# Patient Record
Sex: MALE | Race: WHITE | URBAN - METROPOLITAN AREA
[De-identification: names, ages, dates, MRNs, and addresses within clinical notes are randomized per-mention and may not be internally consistent; named-entity substitution may affect disease eponyms.]

---

## 2017-04-17 ENCOUNTER — TELEPHONE (OUTPATIENT)
Dept: OTHER | Facility: OUTPATIENT CENTER | Age: 67
End: 2017-04-17
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
## 2017-04-19 NOTE — TELEPHONE ENCOUNTER
PER HOSSEIN @ [employer] REF #[id number] - KENNETH OON - NO CO-PAY, $166 DEDUCT (MET) 20% CO-INS W/ AN OOPM $10,000 ($1034.73MET) NO AUTH/EXCLUS. PATIENT RESPON FOR 20% OF $625.60 FOR DA. LVM TO [facility] @ [facility].

## 2017-05-03 ENCOUNTER — OFFICE VISIT (OUTPATIENT)
Dept: OTHER | Facility: OUTPATIENT CENTER | Age: 67
End: 2017-05-03

## 2017-05-03 DIAGNOSIS — F43.23 ADJUSTMENT DISORDER WITH MIXED ANXIETY AND DEPRESSED MOOD: Primary | ICD-10-CM

## 2017-05-03 NOTE — MR AVS SNAPSHOT
After Visit Summary   5/3/2017    William Paige    MRN: [id number]           Patient Information     Date Of Birth          1950        Visit Information        Provider Department      5/3/2017 1:00 PM Wellington Monge, PhD DEV [facility]        Today's Diagnoses     Adjustment disorder with mixed anxiety and depressed mood    -  1       Follow-ups after your visit        Your next 10 appointments already scheduled     May 17, 2017  9:00 AM CDT   INDIVIDUAL THERAPY with Wellington Monge PhD LP   [facility] ([facility])    [address]  Mail [address] 7521  [facility] [address]   [phone]            May 31, 2017  9:00 AM CDT   INDIVIDUAL THERAPY with Wellington Monge PhD LP   [facility] ([facility])    [address]  [address]  [facility] [address]   [phone]              Who to contact     Please call your clinic at [phone] to:    Ask questions about your health    Make or cancel appointments    Discuss your medicines    Learn about your test results    Speak to your doctor   If you have compliments or concerns about an experience at your clinic, or if you wish to file a complaint, please contact [facility] Patient Relations at [phone] or email us at [email or web address]         Additional Information About Your Visit        MyChart Information     Quettra is an electronic gateway that provides easy, online access to your medical records. With Quettra, you can request a clinic appointment, read your test results, renew a prescription or communicate with your care team.     To sign up for [employer] visit the website at [email or web address]   You will be asked to enter the access code listed below, as well as some personal information. Please follow the directions to create your username and password.     Your access code is: [email or web address]  Expires: 8/11/2017  12:12 PM     Your access code will  in 90 days. If you need help or a new code, please contact your [facility] or call [phone] for assistance.        Care EveryWhere ID     This is your Care EveryWhere ID. This could be used by other organizations to access your [facility] medical records  [id number]         Blood Pressure from Last 3 Encounters:   No data found for BP    Weight from Last 3 Encounters:   No data found for Wt              We Performed the Following     Diagnostic Assessment (complete) [01528]     Mental Health Tx Plan Scan (HIM Scan)        Primary Care Provider    None Specified       No primary provider on file.        Thank you!     Thank you for choosing [facility]  for your care. Our goal is always to provide you with excellent care. Hearing back from our patients is one way we can continue to improve our services. Please take a few minutes to complete the written survey that you may receive in the mail after your visit with us. Thank you!             Your Updated Medication List - Protect others around you: Learn how to safely use, store and throw away your medicines at www.disposemymeds.org.      Notice  As of 5/3/2017 11:59 PM    You have not been prescribed any medications.

## 2017-05-13 ASSESSMENT — ANXIETY QUESTIONNAIRES
5. BEING SO RESTLESS THAT IT IS HARD TO SIT STILL: NOT AT ALL
2. NOT BEING ABLE TO STOP OR CONTROL WORRYING: SEVERAL DAYS
7. FEELING AFRAID AS IF SOMETHING AWFUL MIGHT HAPPEN: NOT AT ALL
4. TROUBLE RELAXING: SEVERAL DAYS
1. FEELING NERVOUS, ANXIOUS, OR ON EDGE: SEVERAL DAYS
3. WORRYING TOO MUCH ABOUT DIFFERENT THINGS: SEVERAL DAYS
GAD7 TOTAL SCORE: 4
6. BECOMING EASILY ANNOYED OR IRRITABLE: NOT AT ALL
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
## 2017-05-14 ASSESSMENT — ANXIETY QUESTIONNAIRES: GAD7 TOTAL SCORE: 4

## 2017-05-14 ASSESSMENT — PATIENT HEALTH QUESTIONNAIRE - PHQ9: SUM OF ALL RESPONSES TO PHQ QUESTIONS 1-9: 8

## 2017-05-17 ENCOUNTER — OFFICE VISIT (OUTPATIENT)
Dept: OTHER | Facility: OUTPATIENT CENTER | Age: 67
End: 2017-05-17

## 2017-05-17 DIAGNOSIS — F43.23 ADJUSTMENT DISORDER WITH MIXED ANXIETY AND DEPRESSED MOOD: Primary | ICD-10-CM
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
## 2017-05-17 NOTE — MR AVS SNAPSHOT
After Visit Summary   2017    William Paige    MRN: [id number]           Patient Information     Date Of Birth          1950        Visit Information        Provider Department      2017 9:00 AM Wellington Monge, PhD DEV [facility]        Today's Diagnoses     Adjustment disorder with mixed anxiety and depressed mood    -  1       Follow-ups after your visit        Your next 10 appointments already scheduled     May 31, 2017  9:00 AM CDT   INDIVIDUAL THERAPY with Wellington Monge, PhD DEV   [facility] ([facility])    [address]  [address] 7521  [facility] [address]   [phone]              Who to contact     Please call your clinic at [phone] to:    Ask questions about your health    Make or cancel appointments    Discuss your medicines    Learn about your test results    Speak to your doctor   If you have compliments or concerns about an experience at your clinic, or if you wish to file a complaint, please contact [facility] Patient Relations at [phone] or email us at [email or web address]         Additional Information About Your Visit        MyChart Information     Playnomics is an electronic gateway that provides easy, online access to your medical records. With Playnomics, you can request a clinic appointment, read your test results, renew a prescription or communicate with your care team.     To sign up for [employer] visit the website at [email or web address]   You will be asked to enter the access code listed below, as well as some personal information. Please follow the directions to create your username and password.     Your access code is: [email or web address]  Expires: 2017 12:12 PM     Your access code will  in 90 days. If you need help or a new code, please contact your [facility] or call [phone] for assistance.        Care EveryWhere ID      This is your Care EveryWhere ID. This could be used by other organizations to access your [facility] medical records  [id number]         Blood Pressure from Last 3 Encounters:   No data found for BP    Weight from Last 3 Encounters:   No data found for Wt              We Performed the Following     Individual Psychotherapy (53+ min) [36939]        Primary Care Provider    None Specified       No primary provider on file.        Thank you!     Thank you for choosing [facility]  for your care. Our goal is always to provide you with excellent care. Hearing back from our patients is one way we can continue to improve our services. Please take a few minutes to complete the written survey that you may receive in the mail after your visit with us. Thank you!             Your Updated Medication List - Protect others around you: Learn how to safely use, store and throw away your medicines at www.disposemymeds.org.      Notice  As of 5/17/2017  9:58 AM    You have not been prescribed any medications.

## 2017-05-31 ENCOUNTER — OFFICE VISIT (OUTPATIENT)
Dept: OTHER | Facility: OUTPATIENT CENTER | Age: 67
End: 2017-05-31

## 2017-05-31 DIAGNOSIS — F43.23 ADJUSTMENT DISORDER WITH MIXED ANXIETY AND DEPRESSED MOOD: Primary | ICD-10-CM
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
## 2017-05-31 NOTE — MR AVS SNAPSHOT
After Visit Summary   2017    William Paige    MRN: [id number]           Patient Information     Date Of Birth          1950        Visit Information        Provider Department      2017 9:00 AM Wellington Monge, PhD  [facility]        Today's Diagnoses     Adjustment disorder with mixed anxiety and depressed mood    -  1       Follow-ups after your visit        Who to contact     Please call your clinic at [phone] to:    Ask questions about your health    Make or cancel appointments    Discuss your medicines    Learn about your test results    Speak to your doctor   If you have compliments or concerns about an experience at your clinic, or if you wish to file a complaint, please contact [facility] Patient Relations at [phone] or email us at [email or web address]         Additional Information About Your Visit        MyChart Information     Hotchalk is an electronic gateway that provides easy, online access to your medical records. With Hotchalk, you can request a clinic appointment, read your test results, renew a prescription or communicate with your care team.     To sign up for RUSBASEt visit the website at [email or web address]   You will be asked to enter the access code listed below, as well as some personal information. Please follow the directions to create your username and password.     Your access code is: [email or web address]  Expires: 2017 12:12 PM     Your access code will  in 90 days. If you need help or a new code, please contact your [facility] or call [phone] for assistance.        Care EveryWhere ID     This is your Care EveryWhere ID. This could be used by other organizations to access your [place] medical records  [id number]         Blood Pressure from Last 3 Encounters:   No data found for BP    Weight from Last 3 Encounters:   No data found for Wt               We Performed the Following     Individual Psychotherapy (53+ min) [21962]        Primary Care Provider    None Specified       No primary provider on file.        Thank you!     Thank you for choosing [facility]  for your care. Our goal is always to provide you with excellent care. Hearing back from our patients is one way we can continue to improve our services. Please take a few minutes to complete the written survey that you may receive in the mail after your visit with us. Thank you!             Your Updated Medication List - Protect others around you: Learn how to safely use, store and throw away your medicines at www.disposemymeds.org.      Notice  As of 5/31/2017  9:54 AM    You have not been prescribed any medications.

## 2017-06-19 ENCOUNTER — OFFICE VISIT (OUTPATIENT)
Dept: OTHER | Facility: OUTPATIENT CENTER | Age: 67
End: 2017-06-19

## 2017-06-19 DIAGNOSIS — F43.23 ADJUSTMENT DISORDER WITH MIXED ANXIETY AND DEPRESSED MOOD: Primary | ICD-10-CM

## 2017-06-19 NOTE — PROGRESS NOTES
[facility] -  Case Progress Note    Date of Service: 6/19/17  Client Name: William Paige  YOB: 1950  MRN:  [id number]  Treating Provider: Wellington Monge, PhD LP  Type of Session: Individual  Present in Session: client only (medical student Meghan was present)  Number of Minutes:  55    Current Symptoms/Status:  Client has been experiencing notable anxiety and depression in response to recent stressors, which has interfered with personal well-being and interpersonal functioning.      Progress Toward Treatment Goals:   He has been actively processing his emotions. He has been involved in a [employer] meetup group.    Intervention: Modality and Description:  CBT and psychodynamic techniques were used to help explore the client's emotions and sexuality. Sal is still bothered by noticing men sexually. He wants to be in a relationship, but is concerned that he is overly focused on sexual aspects. He has regrets about what he has not tried sexually in the past, such as S&M or group sex. Sal is concerned that if he experimented with S&M that he would become addicted to sex. Therapist wondered if he could experiment with S&M in a healthy way. Sal did not think it it is wrong to notice men sexually when he is alone. He thinks it is normal to notice men sexually. Sal talked about his interaction with some friends in a car; they were irritated with his sex-related comments. The idea that he has trouble shifting his focus was discussed. He talked about avoiding conflict with others, which may relate to his family of origin. Sal seemed unsure about what issues applied to him and which ones did not. A discussion was had about his social skills and dating. He realized that he did not communicate openly or clearly with Edward. Ways of communicating more openly were discussed. Sal is interested in dating and wonders how to do it.       Response to Intervention:  Open. Verbal. Took  feedback.      Assignment:  Maintain self-care.      Diagnosis:  309.28 - Adjustment Disorder with Mixed Anxiety and Depressed Mood      Plan / Need for Future Services:  Return for therapy in 2 weeks.      Wellington Monge, PhD LP

## 2017-06-19 NOTE — MR AVS SNAPSHOT
After Visit Summary   6/19/2017    William Paige    MRN: [id number]           Patient Information     Date Of Birth          1950        Visit Information        Provider Department      6/19/2017 8:00 AM Wellington Monge, PhD DEV [facility]        Today's Diagnoses     Adjustment disorder with mixed anxiety and depressed mood    -  1       Follow-ups after your visit        Your next 10 appointments already scheduled     Jul 12, 2017  8:00 AM CDT   INDIVIDUAL THERAPY with Wellington Monge PhD LP   [facility] ([facility])    [address]  Mail [address] 7521  [facility] [address]   [phone]            Jul 26, 2017  8:00 AM CDT   INDIVIDUAL THERAPY with Wellington Monge PhD LP   [facility] ([facility])    [address]  [address]  [facility] [address]   [phone]              Who to contact     Please call your clinic at [phone] to:    Ask questions about your health    Make or cancel appointments    Discuss your medicines    Learn about your test results    Speak to your doctor   If you have compliments or concerns about an experience at your clinic, or if you wish to file a complaint, please contact [facility] Patient Relations at [phone] or email us at [email or web address]         Additional Information About Your Visit        MyChart Information     Tripeese is an electronic gateway that provides easy, online access to your medical records. With Tripeese, you can request a clinic appointment, read your test results, renew a prescription or communicate with your care team.     To sign up for Transaqt visit the website at [email or web address]   You will be asked to enter the access code listed below, as well as some personal information. Please follow the directions to create your username and password.     Your access code is: [email or web address]  Expires:  2017 12:12 PM     Your access code will  in 90 days. If you need help or a new code, please contact your [facility] or call [phone] for assistance.        Care EveryWhere ID     This is your Care EveryWhere ID. This could be used by other organizations to access your [place] medical records  [id number]         Blood Pressure from Last 3 Encounters:   No data found for BP    Weight from Last 3 Encounters:   No data found for Wt              We Performed the Following     Individual Psychotherapy (53+ min) [80311]        Primary Care Provider    None Specified       No primary provider on file.        Thank you!     Thank you for choosing [facility]  for your care. Our goal is always to provide you with excellent care. Hearing back from our patients is one way we can continue to improve our services. Please take a few minutes to complete the written survey that you may receive in the mail after your visit with us. Thank you!             Your Updated Medication List - Protect others around you: Learn how to safely use, store and throw away your medicines at www.disposemymeds.org.      Notice  As of 2017  8:57 AM    You have not been prescribed any medications.

## 2017-07-12 ENCOUNTER — OFFICE VISIT (OUTPATIENT)
Dept: OTHER | Facility: OUTPATIENT CENTER | Age: 67
End: 2017-07-12

## 2017-07-12 DIAGNOSIS — F43.23 ADJUSTMENT DISORDER WITH MIXED ANXIETY AND DEPRESSED MOOD: Primary | ICD-10-CM

## 2017-07-12 NOTE — MR AVS SNAPSHOT
After Visit Summary   2017    William Paige    MRN: [id number]           Patient Information     Date Of Birth          1950        Visit Information        Provider Department      2017 8:00 AM Wellington Monge, PhD DEV [facility]        Today's Diagnoses     Adjustment disorder with mixed anxiety and depressed mood    -  1       Follow-ups after your visit        Your next 10 appointments already scheduled     2017  8:00 AM CDT   INDIVIDUAL THERAPY with Wellington Monge, PhD DEV   [facility] ([facility])    [address]  Mail [address] 7521  [facility] [address]   [phone]              Who to contact     Please call your clinic at [phone] to:    Ask questions about your health    Make or cancel appointments    Discuss your medicines    Learn about your test results    Speak to your doctor   If you have compliments or concerns about an experience at your clinic, or if you wish to file a complaint, please contact [facility] Patient Relations at [phone] or email us at [email or web address]         Additional Information About Your Visit        MyChart Information     Smarter Agent Mobile is an electronic gateway that provides easy, online access to your medical records. With Smarter Agent Mobile, you can request a clinic appointment, read your test results, renew a prescription or communicate with your care team.     To sign up for Smarter Agent Mobile visit the website at [email or web address]   You will be asked to enter the access code listed below, as well as some personal information. Please follow the directions to create your username and password.     Your access code is: [email or web address]  Expires: 2017 12:12 PM     Your access code will  in 90 days. If you need help or a new code, please contact your [facility] or call [phone] for assistance.        Care EveryWhere ID      This is your Care EveryWhere ID. This could be used by other organizations to access your [place] medical records  [id number]         Blood Pressure from Last 3 Encounters:   No data found for BP    Weight from Last 3 Encounters:   No data found for Wt              We Performed the Following     Individual Psychotherapy (53+ min) [31461]     Psychotherapy Interactive Complexity [81385]        Primary Care Provider    None Specified       No primary provider on file.        Equal Access to Services     [facility]: Hadii aad ku hadasho Soomaali, waaxda luqadaha, qaybta kaalmada adeegyada, waxay idiin hayaan adeeg jessicaleonarda laPhujorjen . So [facility] [phone].    ATENCIÓN: Si habla español, tiene a douglass disposición servicios gratuitos de asistencia lingüística. Eliame al [phone].    We comply with applicable federal civil rights laws and [place] laws. We do not discriminate on the basis of race, color, national origin, age, disability sex, sexual orientation or gender identity.            Thank you!     Thank you for choosing [facility]  for your care. Our goal is always to provide you with excellent care. Hearing back from our patients is one way we can continue to improve our services. Please take a few minutes to complete the written survey that you may receive in the mail after your visit with us. Thank you!             Your Updated Medication List - Protect others around you: Learn how to safely use, store and throw away your medicines at www.disposemymeds.org.      Notice  As of 7/12/2017  8:57 AM    You have not been prescribed any medications.
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
## 2017-07-26 ENCOUNTER — OFFICE VISIT (OUTPATIENT)
Dept: OTHER | Facility: OUTPATIENT CENTER | Age: 67
End: 2017-07-26

## 2017-07-26 DIAGNOSIS — F43.23 ADJUSTMENT DISORDER WITH MIXED ANXIETY AND DEPRESSED MOOD: Primary | ICD-10-CM

## 2017-07-26 NOTE — PROGRESS NOTES
[facility] -  Case Progress Note    Date of Service: 7/26/17  Client Name: William Paige  YOB: 1950  MRN:  [id number]  Treating Provider: Wellington Monge, PhD LP  Type of Session: Individual  Present in Session: client only  Number of Minutes:  53    Current Symptoms/Status:  Client has been experiencing notable anxiety and depression in response to recent stressors, which has interfered with personal well-being and interpersonal functioning.      Progress Toward Treatment Goals:   He has been actively processing his emotions. Sal has a new sponsor.    Intervention: Modality and Description:  CBT and psychodynamic techniques were used to help explore the client's emotions and sexuality. 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 He is struggling to figure out how to manage his time and do the paperwork. Dealing with clients has been challenging. He has had to be assertive. Sal has been frustrated in meeting new men. He is interested in Richard, a man he met at [employer]. Sudhir and Rell gave Sal Ramos's telephone number. Sal finds it most difficult to ask someone to coffee because he is concerned that the person will say no. He wants to be held and wants to be sexual. Sal talked about Michael (who is villatoro) and Rell (who is heterosexual). Michael has lent Sal money ($2,000) and wants to date him, but Sal is not interested. Sal is interested in Rell. Sal thinks he has been too dependent on Michael. Sal revealed his sexual thoughts to Rell and Michael, including the idea of a threesome, in order to set boundaries. Therapist advised Sal to set boundaries without revealing his sexual thoughts and fantasies. Time was spent talking about how he may need to tolerate his sexual thoughts or fantasies more.      Response to Intervention:  Open. Verbal. Took feedback. Tangential. He would jump topics at times.    Interactive Complexity    There are four specific  communication difficulties that complicate the work of the primary psychiatric procedure.  Interactive complexity (+16036) may be reported when at least one of these difficulties is present.    Communication difficulties present during current the psychiatric procedure include:  1. The need to manage maladaptive communication among participants that complicates delivery of care. Sal was fairly tangential today, making it difficult to follow him and requiring redirection.          Assignment:  Maintain self-care. Write memories of his mother's homonegativity. Start keeping a dream log.      Diagnosis:  309.28 - Adjustment Disorder with Mixed Anxiety and Depressed Mood      Plan / Need for Future Services:  Return for therapy in 2 weeks.      Wellington Monge, PhD LP

## 2017-07-26 NOTE — MR AVS SNAPSHOT
After Visit Summary   2017    William Paige    MRN: [id number]           Patient Information     Date Of Birth          1950        Visit Information        Provider Department      2017 8:00 AM Wellington Monge, PhD  [facility]        Today's Diagnoses     Adjustment disorder with mixed anxiety and depressed mood    -  1       Follow-ups after your visit        Who to contact     Please call your clinic at [phone] to:    Ask questions about your health    Make or cancel appointments    Discuss your medicines    Learn about your test results    Speak to your doctor   If you have compliments or concerns about an experience at your clinic, or if you wish to file a complaint, please contact [facility] Patient Relations at [phone] or email us at [email or web address]         Additional Information About Your Visit        MyChart Information     Linkurious is an electronic gateway that provides easy, online access to your medical records. With Linkurious, you can request a clinic appointment, read your test results, renew a prescription or communicate with your care team.     To sign up for ChaChat visit the website at [email or web address]   You will be asked to enter the access code listed below, as well as some personal information. Please follow the directions to create your username and password.     Your access code is: [email or web address]  Expires: 2017 12:12 PM     Your access code will  in 90 days. If you need help or a new code, please contact your [facility] or call [phone] for assistance.        Care EveryWhere ID     This is your Care EveryWhere ID. This could be used by other organizations to access your [place] medical records  [id number]         Blood Pressure from Last 3 Encounters:   No data found for BP    Weight from Last 3 Encounters:   No data found for Wt               We Performed the Following     Individual Psychotherapy (53+ min) [64536]     Psychotherapy Interactive Complexity [94870]        Primary Care Provider    None Specified       No primary provider on file.        Equal Access to Services     LURDES PETERSON : Kiran Jacobo, malinda duarte, almaerika postcatrinacarrie elmorecassicarrie, waxmima nnamdiin hayaadeandra constantinomatthew sanford bernardo arrington. So [facility] [place] [phone].    ATENCIÓN: Si habla español, tiene a douglass disposición servicios gratuitos de asistencia lingüística. Llame al [phone].    We comply with applicable federal civil rights laws and [place] laws. We do not discriminate on the basis of race, color, national origin, age, disability sex, sexual orientation or gender identity.            Thank you!     Thank you for choosing [facility]  for your care. Our goal is always to provide you with excellent care. Hearing back from our patients is one way we can continue to improve our services. Please take a few minutes to complete the written survey that you may receive in the mail after your visit with us. Thank you!             Your Updated Medication List - Protect others around you: Learn how to safely use, store and throw away your medicines at www.disposemymeds.org.      Notice  As of 7/26/2017  8:58 AM    You have not been prescribed any medications.

## 2017-08-09 ENCOUNTER — OFFICE VISIT (OUTPATIENT)
Dept: OTHER | Facility: OUTPATIENT CENTER | Age: 67
End: 2017-08-09

## 2017-08-09 DIAGNOSIS — F43.23 ADJUSTMENT DISORDER WITH MIXED ANXIETY AND DEPRESSED MOOD: Primary | ICD-10-CM

## 2017-08-09 NOTE — PROGRESS NOTES
[facility] -  Case Progress Note    Date of Service: 17  Client Name: William Paige  YOB: 1950  MRN:  [id number]  Treating Provider: Wellington Monge, PhD LP  Type of Session: Individual  Present in Session: client only  Number of Minutes:  40 (he arrived late)    Current Symptoms/Status:  Client has been experiencing notable anxiety and depression in response to recent stressors, which has interfered with personal well-being and interpersonal functioning.      Progress Toward Treatment Goals:   He has been actively processing his emotions. Sal has a new sponsor.    Intervention: Modality and Description:  CBT and psychodynamic techniques were used to help explore the client's emotions and sexuality. Sal has been sick for a week. He talked about the challenges of his job. He has to deal with conflict and chaos. A hug from a co-worker reminded him that he wants a partner. Sal admitted he has done little to date. He worried that he will have sex with a friend. He mentioned that he finds his sponsor sexually attractive. Therapist asked some questions and noted that Sal does not seem to have friends he finds sexually attractive--so why is he concerned about having sex with someone? Sal considered that he tries to create a problem. His mother was a worrier and she was against homosexuality. He came to her when he was 20 and she  when he was in his 50s. In his 40s, she mentioned that he is her son, but that she could not accept his lifestyle. Sal once called her for support and asked her to visit, and she said she would think about it. He was hurt. Therapist questioned if her homonegativity really affected him because Sal had two long-term relationships with men. He agreed. Sal had a hard time identifying how he would date. He eventually stated that he used match.com, [employer], and others (e.g., [employer]) to meet men for dating. He met some men. Sal eventually  stated that he stopped using the websites because his sponsor advised him that it was unhealthy. Therapist suggested that Sal needs to be an active critical thinker about what is healthy and unhealthy, rather than just parroting or following what others say. He understood.      Response to Intervention:  Open. Verbal. Took feedback. Tangential. He would jump topics at times.    Interactive Complexity    There are four specific communication difficulties that complicate the work of the primary psychiatric procedure.  Interactive complexity (+09062) may be reported when at least one of these difficulties is present.    Communication difficulties present during current the psychiatric procedure include:  1. The need to manage maladaptive communication among participants that complicates delivery of care. Sal was fairly tangential today, making it difficult to follow him and requiring redirection.    Assignment:  Maintain self-care. Join dating websites. Prepare to talk about trauma. Past tasks: Write memories of his mother's homonegativity. Start keeping a dream log.      Diagnosis:  309.28 - Adjustment Disorder with Mixed Anxiety and Depressed Mood      Plan / Need for Future Services:  Return for therapy in 2 weeks.      Wellington Monge, PhD LP

## 2017-08-09 NOTE — MR AVS SNAPSHOT
After Visit Summary   2017    William Paige    MRN: [id number]           Patient Information     Date Of Birth          1950        Visit Information        Provider Department      2017 8:00 AM Wellington Monge, PhD DEV [facility]        Today's Diagnoses     Adjustment disorder with mixed anxiety and depressed mood    -  1       Follow-ups after your visit        Your next 10 appointments already scheduled     Aug 23, 2017  9:00 AM CDT   INDIVIDUAL THERAPY with Wellington Monge, PhD DEV   [facility] ([facility])    [address]  Mail Code 7521  [facility] [address]   [phone]              Who to contact     Please call your clinic at [phone] to:    Ask questions about your health    Make or cancel appointments    Discuss your medicines    Learn about your test results    Speak to your doctor   If you have compliments or concerns about an experience at your clinic, or if you wish to file a complaint, please contact [facility] Patient Relations at [phone] or email us at [email or web address]         Additional Information About Your Visit        MyChart Information     [email or web address] is an electronic gateway that provides easy, online access to your medical records. With [email or web address], you can request a clinic appointment, read your test results, renew a prescription or communicate with your care team.     To sign up for [email or web address] visit the website at [email or web address]   You will be asked to enter the access code listed below, as well as some personal information. Please follow the directions to create your username and password.     Your access code is: [email or web address]  Expires: 2017 12:12 PM     Your access code will  in 90 days. If you need help or a new code, please contact your [facility] or call [phone] for assistance.        Care EveryWhere ID      This is your Care EveryWhere ID. This could be used by other organizations to access your [place] medical records  [id number]         Blood Pressure from Last 3 Encounters:   No data found for BP    Weight from Last 3 Encounters:   No data found for Wt              We Performed the Following     Individual Psychotherapy (38-52 min) [28177]     Psychotherapy Interactive Complexity [49813]        Primary Care Provider    None Specified       No primary provider on file.        Equal Access to Services     [facility]: Hadii aad ku hadasho Socaylaali, waaxda luqadaha, qaybta kaalmada adeegyada, waxay nnamdiin hayjorjen catarina jessicaleonarda lalorenzo . So [facility] [phone].    ATENCIÓN: Si habla español, tiene a douglass disposición servicios gratuitos de asistencia lingüística. Llame al [phone].    We comply with applicable federal civil rights laws and [place] laws. We do not discriminate on the basis of race, color, national origin, age, disability sex, sexual orientation or gender identity.            Thank you!     Thank you for choosing [facility]  for your care. Our goal is always to provide you with excellent care. Hearing back from our patients is one way we can continue to improve our services. Please take a few minutes to complete the written survey that you may receive in the mail after your visit with us. Thank you!             Your Updated Medication List - Protect others around you: Learn how to safely use, store and throw away your medicines at www.disposemymeds.org.      Notice  As of 8/9/2017  8:52 AM    You have not been prescribed any medications.

## 2017-08-16 ENCOUNTER — HOSPITAL ENCOUNTER (EMERGENCY)
Facility: CLINIC | Age: 67
Discharge: HOME | End: 2017-08-17
Attending: EMERGENCY MEDICINE | Admitting: EMERGENCY MEDICINE
Payer: COMMERCIAL

## 2017-08-16 ENCOUNTER — APPOINTMENT (OUTPATIENT)
Dept: GENERAL RADIOLOGY | Facility: CLINIC | Age: 67
End: 2017-08-16
Attending: EMERGENCY MEDICINE
Payer: COMMERCIAL

## 2017-08-16 ENCOUNTER — APPOINTMENT (OUTPATIENT)
Dept: CT IMAGING | Facility: CLINIC | Age: 67
End: 2017-08-16
Attending: EMERGENCY MEDICINE
Payer: COMMERCIAL

## 2017-08-16 DIAGNOSIS — N20.1 URETEROLITHIASIS: ICD-10-CM

## 2017-08-16 DIAGNOSIS — N23 RENAL COLIC: ICD-10-CM

## 2017-08-16 DIAGNOSIS — R05.9 COUGH: ICD-10-CM

## 2017-08-16 LAB
ALBUMIN SERPL-MCNC: 3.4 G/DL (ref 3.4–5)
ALBUMIN UR-MCNC: 30 MG/DL
ALP SERPL-CCNC: 123 U/L (ref 40–150)
ALT SERPL W P-5'-P-CCNC: 37 U/L (ref 0–70)
ANION GAP SERPL CALCULATED.3IONS-SCNC: 9 MMOL/L (ref 3–14)
APPEARANCE UR: ABNORMAL
AST SERPL W P-5'-P-CCNC: 19 U/L (ref 0–45)
BASOPHILS # BLD AUTO: 0 10E9/L (ref 0–0.2)
BASOPHILS NFR BLD AUTO: 0.4 %
BILIRUB SERPL-MCNC: 1 MG/DL (ref 0.2–1.3)
BILIRUB UR QL STRIP: NEGATIVE
BUN SERPL-MCNC: 14 MG/DL (ref 7–30)
CALCIUM SERPL-MCNC: 8.4 MG/DL (ref 8.5–10.1)
CHLORIDE SERPL-SCNC: 103 MMOL/L (ref 94–109)
CO2 SERPL-SCNC: 24 MMOL/L (ref 20–32)
COLOR UR AUTO: ABNORMAL
CREAT SERPL-MCNC: 0.79 MG/DL (ref 0.66–1.25)
DIFFERENTIAL METHOD BLD: ABNORMAL
EOSINOPHIL # BLD AUTO: 0.2 10E9/L (ref 0–0.7)
EOSINOPHIL NFR BLD AUTO: 2.1 %
ERYTHROCYTE [DISTWIDTH] IN BLOOD BY AUTOMATED COUNT: 14.5 % (ref 10–15)
GFR SERPL CREATININE-BSD FRML MDRD: >90 ML/MIN/1.7M2
GLUCOSE SERPL-MCNC: 108 MG/DL (ref 70–99)
GLUCOSE UR STRIP-MCNC: NEGATIVE MG/DL
HCT VFR BLD AUTO: 38.4 % (ref 40–53)
HGB BLD-MCNC: 12.8 G/DL (ref 13.3–17.7)
HGB UR QL STRIP: ABNORMAL
IMM GRANULOCYTES # BLD: 0.1 10E9/L (ref 0–0.4)
IMM GRANULOCYTES NFR BLD: 1.1 %
KETONES UR STRIP-MCNC: NEGATIVE MG/DL
LEUKOCYTE ESTERASE UR QL STRIP: NEGATIVE
LYMPHOCYTES # BLD AUTO: 1.9 10E9/L (ref 0.8–5.3)
LYMPHOCYTES NFR BLD AUTO: 18.3 %
MCH RBC QN AUTO: 27.9 PG (ref 26.5–33)
MCHC RBC AUTO-ENTMCNC: 33.3 G/DL (ref 31.5–36.5)
MCV RBC AUTO: 84 FL (ref 78–100)
MONOCYTES # BLD AUTO: 0.9 10E9/L (ref 0–1.3)
MONOCYTES NFR BLD AUTO: 8.8 %
NEUTROPHILS # BLD AUTO: 7.3 10E9/L (ref 1.6–8.3)
NEUTROPHILS NFR BLD AUTO: 69.3 %
NITRATE UR QL: NEGATIVE
NRBC # BLD AUTO: 0 10*3/UL
NRBC BLD AUTO-RTO: 0 /100
PH UR STRIP: 5.5 PH (ref 5–7)
PLATELET # BLD AUTO: 236 10E9/L (ref 150–450)
POTASSIUM SERPL-SCNC: 3.9 MMOL/L (ref 3.4–5.3)
PROT SERPL-MCNC: 7.3 G/DL (ref 6.8–8.8)
RBC # BLD AUTO: 4.59 10E12/L (ref 4.4–5.9)
RBC #/AREA URNS AUTO: >100 /HPF
SODIUM SERPL-SCNC: 136 MMOL/L (ref 133–144)
SOURCE: ABNORMAL
SP GR UR STRIP: 1.02 (ref 1–1.03)
UROBILINOGEN UR STRIP-ACNC: 1 EU/DL (ref 0.2–1)
WBC # BLD AUTO: 10.6 10E9/L (ref 4–11)
WBC #/AREA URNS AUTO: ABNORMAL /HPF

## 2017-08-16 PROCEDURE — 74176 CT ABD & PELVIS W/O CONTRAST: CPT

## 2017-08-16 PROCEDURE — 87801 DETECT AGNT MULT DNA AMPLI: CPT | Performed by: EMERGENCY MEDICINE

## 2017-08-16 PROCEDURE — 96374 THER/PROPH/DIAG INJ IV PUSH: CPT

## 2017-08-16 PROCEDURE — 71020 XR CHEST 2 VW: CPT

## 2017-08-16 PROCEDURE — 96361 HYDRATE IV INFUSION ADD-ON: CPT

## 2017-08-16 PROCEDURE — 25000128 H RX IP 250 OP 636: Performed by: EMERGENCY MEDICINE

## 2017-08-16 PROCEDURE — 99285 EMERGENCY DEPT VISIT HI MDM: CPT | Mod: 25

## 2017-08-16 PROCEDURE — 25000125 ZZHC RX 250: Performed by: EMERGENCY MEDICINE

## 2017-08-16 PROCEDURE — 94640 AIRWAY INHALATION TREATMENT: CPT

## 2017-08-16 PROCEDURE — 81001 URINALYSIS AUTO W/SCOPE: CPT | Performed by: EMERGENCY MEDICINE

## 2017-08-16 RX ORDER — MORPHINE SULFATE 4 MG/ML
4 INJECTION, SOLUTION INTRAMUSCULAR; INTRAVENOUS
Status: DISCONTINUED | OUTPATIENT
Start: 2017-08-16 | End: 2017-08-17 | Stop reason: HOSPADM

## 2017-08-16 RX ORDER — SODIUM CHLORIDE 9 MG/ML
1000 INJECTION, SOLUTION INTRAVENOUS CONTINUOUS
Status: DISCONTINUED | OUTPATIENT
Start: 2017-08-16 | End: 2017-08-17 | Stop reason: HOSPADM

## 2017-08-16 RX ORDER — IPRATROPIUM BROMIDE AND ALBUTEROL SULFATE 2.5; .5 MG/3ML; MG/3ML
3 SOLUTION RESPIRATORY (INHALATION) ONCE
Status: COMPLETED | OUTPATIENT
Start: 2017-08-16 | End: 2017-08-16

## 2017-08-16 RX ADMIN — IPRATROPIUM BROMIDE AND ALBUTEROL SULFATE 3 ML: .5; 3 SOLUTION RESPIRATORY (INHALATION) at 23:49

## 2017-08-16 RX ADMIN — SODIUM CHLORIDE 1000 ML: 9 INJECTION, SOLUTION INTRAVENOUS at 22:56

## 2017-08-16 ASSESSMENT — ENCOUNTER SYMPTOMS
COUGH: 1
VOMITING: 1
SORE THROAT: 1
FEVER: 0

## 2017-08-16 NOTE — ED AVS SNAPSHOT
Emergency Department    [address] [place] [address]    Phone:  [phone]    Fax:  [phone]                                       William Paige   MRN: [id number]    Department:   Emergency Department   Date of Visit:  8/16/2017           After Visit Summary Signature Page     I have received my discharge instructions, and my questions have been answered. I have discussed any challenges I see with this plan with the nurse or doctor.    ..........................................................................................................................................  Patient/Patient Representative Signature      ..........................................................................................................................................  Patient Representative Print Name and Relationship to Patient    ..................................................               ................................................  Date                                            Time    ..........................................................................................................................................  Reviewed by Signature/Title    ...................................................              ..............................................  Date                                                            Time

## 2017-08-16 NOTE — ED AVS SNAPSHOT
"  Emergency Department    [address] [facility] [address]    Phone:  [phone]    Fax:  [phone]                                       William Paige   MRN: [id number]    Department:   Emergency Department   Date of Visit:  8/16/2017           Patient Information     Date Of Birth          1950        Your diagnoses for this visit were:     Cough possible pertussis    Renal colic     Ureterolithiasis        You were seen by Zach King MD.      Follow-up Information     Follow up with Anthony Varma MD. Schedule an appointment as soon as possible for a visit in 2 days.    Specialty:  Student in organized health care education/training program    Contact information:    [facility]  [address] [place] [id number] [phone]          Follow up with  Emergency Department.    Specialty:  EMERGENCY MEDICINE    Why:  If symptoms worsen    Contact information:    [address] [facility] [address] [phone]        Discharge Instructions          * KIDNEY STONE (w/ Colic)    The sharp cramping pain and nausea/vomiting that you have is due to a small stone which has formed in the kidney and is now passing down a narrow tube (ureter) on its way to your bladder. Once it reaches your bladder, the pain will stop. The stone may pass in your urine stream in one piece. [The size may be 1/16\" to 1/4\" (1-6mm)]. Or, the stone may also break up into josé fragments which you may not even notice.  Once you have had a kidney stone there is a risk for recurrence in the future.  HOME CARE:    Drink lots of fluid (at least 8-10 glasses of water a day).    Most stones will pass on their own, but may take from a few hours to a few days.    Each time you urinate, do so in a jar. Pour the urine from the jar through the strainer and into the toilet. Continue doing this until 24 hours after your pain stops. By then, if there was a kidney stone, it " should pass from your bladder. Some stones dissolve into sand-like particles and pass right through the strainer. In that case, you won't ever see a stone.    Save any stone that you find in the strainer and bring it to your doctor for analysis. It may be possible to prevent certain types of stones from forming. Therefore, it is important to know what kind of stone you have.    Try to stay as active as possible since this will help the stone pass. Do not stay in bed unless your pain prevents you from getting up. You may notice a red, pink or brown color to your urine. This is normal while passing a kidney stone.  FOLLOW UP with your doctor or return to this facility if the pain lasts more than 48 hours.  GET PROMPT MEDICAL ATTENTION if any of the following occur:    Pain that is not controlled by the medicine given    Repeated vomiting or unable to keep down fluids    Weakness, dizziness or fainting    Fever over 101  F (38.3  C)    Passage of solid red or brown urine (can't see through it) or urine with lots of blood clots    Unable to pass urine for 8 hours and increasing bladder pressure    2861-5489 [place] [address]. All rights reserved. This information is not intended as a substitute for professional medical care. Always follow your healthcare professional's instructions.      Discharge References/Attachments     BRONCHITIS WITH WHEEZING (ADULT) (ENGLISH)    WHOOPING COUGH PERTUSSIS (ADULT) (ENGLISH)      Future Appointments        Provider Department Dept Phone Center    8/23/2017 9:00 AM Wellington Monge, PhD  [facility] [phone] [facility] Owned      24 Hour Appointment Hotline       To make an appointment at any [facility], call [phone] ([phone]). If you don't have a family doctor or clinic, we will help you find one. [facility] are conveniently located to serve the needs of you and your family.             Review of your  medicines      START taking        Dose / Directions Last dose taken    albuterol 108 (90 BASE) MCG/ACT Inhaler   Commonly known as:  albuterol   Dose:  2 puff   Quantity:  1 Inhaler        Inhale 2 puffs into the lungs every 4 hours as needed for shortness of breath / dyspnea   Refills:  0        azithromycin 250 MG tablet   Commonly known as:  ZITHROMAX   Quantity:  4 tablet        one tablet daily for four more days.   Refills:  0        dextromethorphan 30 MG/5ML liquid   Commonly known as:  DELSYM   Dose:  60 mg   Quantity:  148 mL        Take 10 mLs (60 mg) by mouth 2 times daily   Refills:  0        predniSONE 20 MG tablet   Commonly known as:  DELTASONE   Dose:  60 mg   Quantity:  12 tablet        Take 3 tablets (60 mg) by mouth daily for 4 days   Refills:  0          Our records show that you are taking the medicines listed below. If these are incorrect, please call your family doctor or clinic.        Dose / Directions Last dose taken    LISINOPRIL PO        Refills:  0        VENLAFAXINE HCL PO        Refills:  0                Prescriptions were sent or printed at these locations (4 Prescriptions)                   Other Prescriptions                Printed at Department/Unit printer (4 of 4)         predniSONE (DELTASONE) 20 MG tablet               albuterol (ALBUTEROL) 108 (90 BASE) MCG/ACT Inhaler               dextromethorphan (DELSYM) 30 MG/5ML liquid               azithromycin (ZITHROMAX) 250 MG tablet                Procedures and tests performed during your visit     *UA reflex to Microscopic    Bordetella per/paraper PCR    CBC with platelets differential    CT Abdomen Pelvis w/o Contrast    Chest XR,  PA & LAT    Comprehensive metabolic panel    Peripheral IV: Standard    Urine Microscopic      Orders Needing Specimen Collection     None      Pending Results     Date and Time Order Name Status Description    8/16/2017 1754 Bordetella per/paraper PCR In process     8/16/2017 6411 Chest XR,  PA &  LAT Preliminary     8/16/2017 2222 CT Abdomen Pelvis w/o Contrast Preliminary             Pending Culture Results     Date and Time Order Name Status Description    8/16/2017 2258 Bordetella per/paraper PCR In process             Pending Results Instructions     If you had any lab results that were not finalized at the time of your Discharge, you can call the ED Lab Result RN at [phone]. You will be contacted by this team for any positive Lab results or changes in treatment. The nurses are available 7 days a week from 10A to 6:30P.  You can leave a message 24 hours per day and they will return your call.        Test Results From Your Hospital Stay        8/16/2017 11:02 PM      Component Results     Component Value Ref Range & Units Status    WBC 10.6 4.0 - 11.0 10e9/L Final    RBC Count 4.59 4.4 - 5.9 10e12/L Final    Hemoglobin 12.8 (L) 13.3 - 17.7 g/dL Final    Hematocrit 38.4 (L) 40.0 - 53.0 % Final    MCV 84 78 - 100 fl Final    MCH 27.9 26.5 - 33.0 pg Final    MCHC 33.3 31.5 - 36.5 g/dL Final    RDW 14.5 10.0 - 15.0 % Final    Platelet Count 236 150 - 450 10e9/L Final    Diff Method Automated Method  Final    % Neutrophils 69.3 % Final    % Lymphocytes 18.3 % Final    % Monocytes 8.8 % Final    % Eosinophils 2.1 % Final    % Basophils 0.4 % Final    % Immature Granulocytes 1.1 % Final    Nucleated RBCs 0 0 /100 Final    Absolute Neutrophil 7.3 1.6 - 8.3 10e9/L Final    Absolute Lymphocytes 1.9 0.8 - 5.3 10e9/L Final    Absolute Monocytes 0.9 0.0 - 1.3 10e9/L Final    Absolute Eosinophils 0.2 0.0 - 0.7 10e9/L Final    Absolute Basophils 0.0 0.0 - 0.2 10e9/L Final    Abs Immature Granulocytes 0.1 0 - 0.4 10e9/L Final    Absolute Nucleated RBC 0.0  Final         8/16/2017 11:18 PM      Component Results     Component Value Ref Range & Units Status    Sodium 136 133 - 144 mmol/L Final    Potassium 3.9 3.4 - 5.3 mmol/L Final    Chloride 103 94 - 109 mmol/L Final    Carbon Dioxide 24 20 - 32 mmol/L Final     Anion Gap 9 3 - 14 mmol/L Final    Glucose 108 (H) 70 - 99 mg/dL Final    Urea Nitrogen 14 7 - 30 mg/dL Final    Creatinine 0.79 0.66 - 1.25 mg/dL Final    GFR Estimate >90 >60 mL/min/1.7m2 Final    Non  GFR Calc    GFR Estimate If Black >90 >60 mL/min/1.7m2 Final    African American GFR Calc    Calcium 8.4 (L) 8.5 - 10.1 mg/dL Final    Bilirubin Total 1.0 0.2 - 1.3 mg/dL Final    Albumin 3.4 3.4 - 5.0 g/dL Final    Protein Total 7.3 6.8 - 8.8 g/dL Final    Alkaline Phosphatase 123 40 - 150 U/L Final    ALT 37 0 - 70 U/L Final    AST 19 0 - 45 U/L Final         8/16/2017 11:50 PM      Narrative     CT ABDOMEN PELVIS W/O CONTRAST  8/16/2017 11:35 PM      HISTORY: Left abdominal pain.    TECHNIQUE: Imaging performed from the diaphragm to the base of the  bladder using the renal stone protocol. No oral or intravenous  contrast. Radiation dose for this scan was reduced using automated  exposure control, adjustment of the mA and/or kV according to patient  size, or iterative reconstruction technique.     COMPARISON: None.    FINDINGS:  Abdomen: There is mild dilatation of the left renal collecting system  and ureter into the pelvis where there is a 0.4 cm distal ureteral  stone approximately 3 cm above the ureterovesical junction. No other  urinary stones on either side. There is cortical scarring in the  anterior left kidney. Large exophytic cyst at the upper pole of the  right kidney anteriorly measuring 7.2 cm.    There is mild dependent atelectasis and scarring at the lung bases.  The heart size is normal. Evaluation of the solid abdominal organs is  limited by the lack of intravenous contrast. The liver, spleen,  pancreas and adrenal glands are normal in appearance. Tiny stone in  the gallbladder. There is no abdominal or pelvic lymph node  enlargement.    Pelvis: There are radiation seeds in the prostate gland and  periprostatic tissues. No bowel obstruction or inflammation. The  appendix is normal.  No free intraperitoneal gas or fluid. Degenerative  disease in the spine.        Impression     IMPRESSION:  1. There is a 0.4 cm distal left ureteral stone causing mild  hydronephrosis.  2. Cholelithiasis.         8/16/2017 11:25 PM      Narrative     XR CHEST 2 VW  8/16/2017 11:23 PM      HISTORY: Cough.    COMPARISON: None.    FINDINGS: The heart size is normal. The lungs are clear. No  pneumothorax or pleural effusion.        Impression     IMPRESSION: No acute abnormality.         8/16/2017 10:58 PM      Component Results     Component Value Ref Range & Units Status    Color Urine Lia  Final    Appearance Urine Cloudy  Final    Glucose Urine Negative NEG^Negative mg/dL Final    Bilirubin Urine Negative NEG^Negative Final    Ketones Urine Negative NEG^Negative mg/dL Final    Specific Gravity Urine 1.020 1.003 - 1.035 Final    Blood Urine Large (A) NEG^Negative Final    pH Urine 5.5 5.0 - 7.0 pH Final    Protein Albumin Urine 30 (A) NEG^Negative mg/dL Final    Urobilinogen Urine 1.0 0.2 - 1.0 EU/dL Final    Nitrite Urine Negative NEG^Negative Final    Leukocyte Esterase Urine Negative NEG^Negative Final    Source Midstream Urine  Final         8/16/2017 10:58 PM      Component Results     Component Value Ref Range & Units Status    WBC Urine O - 2 OTO2^O - 2 /HPF Final    RBC Urine >100 (A) OTO2^O - 2 /HPF Final         8/16/2017 11:23 PM                Clinical Quality Measure: Blood Pressure Screening     Your blood pressure was checked while you were in the emergency department today. The last reading we obtained was  BP: 142/83 . Please read the guidelines below about what these numbers mean and what you should do about them.  If your systolic blood pressure (the top number) is less than 120 and your diastolic blood pressure (the bottom number) is less than 80, then your blood pressure is normal. There is nothing more that you need to do about it.  If your systolic blood pressure (the top number) is 120-139  "or your diastolic blood pressure (the bottom number) is 80-89, your blood pressure may be higher than it should be. You should have your blood pressure rechecked within a year by a primary care provider.  If your systolic blood pressure (the top number) is 140 or greater or your diastolic blood pressure (the bottom number) is 90 or greater, you may have high blood pressure. High blood pressure is treatable, but if left untreated over time it can put you at risk for heart attack, stroke, or kidney failure. You should have your blood pressure rechecked by a primary care provider within the next 4 weeks.  If your provider in the emergency department today gave you specific instructions to follow-up with your doctor or provider even sooner than that, you should follow that instruction and not wait for up to 4 weeks for your follow-up visit.        Thank you for choosing [facility]       Thank you for choosing [facility] for your care. Our goal is always to provide you with excellent care. Hearing back from our patients is one way we can continue to improve our services. Please take a few minutes to complete the written survey that you may receive in the mail after you visit with us. Thank you!        OnePIN Information     OnePIN lets you send messages to your doctor, view your test results, renew your prescriptions, schedule appointments and more. To sign up, go to [email or web address] . Click on \"Log in\" on the left side of the screen, which will take you to the Welcome page. Then click on \"Sign up Now\" on the right side of the page.     You will be asked to enter the access code listed below, as well as some personal information. Please follow the directions to create your username and password.     Your access code is: [email or web address]  Expires: 11/15/2017  1:36 AM     Your access code will  in 90 days. If you need help or a new code, please call your [facility] clinic or [phone].        Care EveryWhere ID     " This is your Care EveryWhere ID. This could be used by other organizations to access your [facility] medical records  [id number]        Equal Access to Services     LURDES PETERSON : Kiran Jacobo, malinda duarte, lucero pang, ina arrington. So [facility] [phone].    ATENCIÓN: Si habla español, tiene a douglass disposición servicios gratuitos de asistencia lingüística. Llame al [phone].    We comply with applicable federal civil rights laws and [place] laws. We do not discriminate on the basis of race, color, national origin, age, disability sex, sexual orientation or gender identity.            After Visit Summary       This is your record. Keep this with you and show to your community pharmacist(s) and doctor(s) at your next visit.

## 2017-08-17 VITALS
DIASTOLIC BLOOD PRESSURE: 83 MMHG | TEMPERATURE: 98.7 F | SYSTOLIC BLOOD PRESSURE: 142 MMHG | BODY MASS INDEX: 30.16 KG/M2 | WEIGHT: 199 LBS | RESPIRATION RATE: 18 BRPM | OXYGEN SATURATION: 97 % | HEART RATE: 82 BPM | HEIGHT: 68 IN

## 2017-08-17 LAB
B PERT+PARAPERT DNA PNL SPEC NAA+PROBE: NEGATIVE
SPECIMEN SOURCE: NORMAL

## 2017-08-17 PROCEDURE — 25000132 ZZH RX MED GY IP 250 OP 250 PS 637: Performed by: EMERGENCY MEDICINE

## 2017-08-17 PROCEDURE — 25000125 ZZHC RX 250: Performed by: EMERGENCY MEDICINE

## 2017-08-17 PROCEDURE — 25000128 H RX IP 250 OP 636: Performed by: EMERGENCY MEDICINE

## 2017-08-17 RX ORDER — DEXTROMETHORPHAN POLISTIREX 30 MG/5ML
60 SUSPENSION ORAL 2 TIMES DAILY
Qty: 148 ML | Refills: 0 | Status: SHIPPED | OUTPATIENT
Start: 2017-08-17

## 2017-08-17 RX ORDER — HYDROCODONE BITARTRATE AND ACETAMINOPHEN 5; 325 MG/1; MG/1
2 TABLET ORAL ONCE
Status: DISCONTINUED | OUTPATIENT
Start: 2017-08-17 | End: 2017-08-17 | Stop reason: HOSPADM

## 2017-08-17 RX ORDER — AZITHROMYCIN 250 MG/1
500 TABLET, FILM COATED ORAL ONCE
Status: COMPLETED | OUTPATIENT
Start: 2017-08-17 | End: 2017-08-17

## 2017-08-17 RX ORDER — PREDNISONE 20 MG/1
60 TABLET ORAL DAILY
Qty: 12 TABLET | Refills: 0 | Status: SHIPPED | OUTPATIENT
Start: 2017-08-17 | End: 2017-08-21

## 2017-08-17 RX ORDER — AZITHROMYCIN 250 MG/1
TABLET, FILM COATED ORAL
Qty: 4 TABLET | Refills: 0 | Status: SHIPPED | OUTPATIENT
Start: 2017-08-17

## 2017-08-17 RX ORDER — PREDNISONE 20 MG/1
60 TABLET ORAL ONCE
Status: COMPLETED | OUTPATIENT
Start: 2017-08-17 | End: 2017-08-17

## 2017-08-17 RX ORDER — ALBUTEROL SULFATE 90 UG/1
2 AEROSOL, METERED RESPIRATORY (INHALATION) EVERY 4 HOURS PRN
Qty: 1 INHALER | Refills: 0 | Status: SHIPPED | OUTPATIENT
Start: 2017-08-17

## 2017-08-17 RX ADMIN — PREDNISONE 60 MG: 20 TABLET ORAL at 00:23

## 2017-08-17 RX ADMIN — MORPHINE SULFATE 4 MG: 4 INJECTION, SOLUTION INTRAMUSCULAR; INTRAVENOUS at 00:21

## 2017-08-17 RX ADMIN — AZITHROMYCIN 500 MG: 250 TABLET, FILM COATED ORAL at 01:16

## 2017-08-17 NOTE — DISCHARGE INSTRUCTIONS
"   * KIDNEY STONE (w/ Colic)    The sharp cramping pain and nausea/vomiting that you have is due to a small stone which has formed in the kidney and is now passing down a narrow tube (ureter) on its way to your bladder. Once it reaches your bladder, the pain will stop. The stone may pass in your urine stream in one piece. [The size may be 1/16\" to 1/4\" (1-6mm)]. Or, the stone may also break up into josé fragments which you may not even notice.  Once you have had a kidney stone there is a risk for recurrence in the future.  HOME CARE:    Drink lots of fluid (at least 8-10 glasses of water a day).    Most stones will pass on their own, but may take from a few hours to a few days.    Each time you urinate, do so in a jar. Pour the urine from the jar through the strainer and into the toilet. Continue doing this until 24 hours after your pain stops. By then, if there was a kidney stone, it should pass from your bladder. Some stones dissolve into sand-like particles and pass right through the strainer. In that case, you won't ever see a stone.    Save any stone that you find in the strainer and bring it to your doctor for analysis. It may be possible to prevent certain types of stones from forming. Therefore, it is important to know what kind of stone you have.    Try to stay as active as possible since this will help the stone pass. Do not stay in bed unless your pain prevents you from getting up. You may notice a red, pink or brown color to your urine. This is normal while passing a kidney stone.  FOLLOW UP with your doctor or return to this facility if the pain lasts more than 48 hours.  GET PROMPT MEDICAL ATTENTION if any of the following occur:    Pain that is not controlled by the medicine given    Repeated vomiting or unable to keep down fluids    Weakness, dizziness or fainting    Fever over 101  F (38.3  C)    Passage of solid red or brown urine (can't see through it) or urine with lots of blood clots    Unable " to pass urine for 8 hours and increasing bladder pressure    6785-3991 Melissa [employer], [address] [place] [address], [place] [address]. All rights reserved. This information is not intended as a substitute for professional medical care. Always follow your healthcare professional's instructions.

## 2017-08-17 NOTE — ED NOTES
Pt presents to ER with persistent productive cough for one week. Pt is taking musinex, a cough syrup and using his NEB due to being prone to bronchitis. [facility], stated he had left flank pain and they sent him to the clinic for a urine. Pt states they did no scan but stated there was blood in his urine.

## 2017-08-17 NOTE — ED PROVIDER NOTES
"  History     Chief Complaint:  Cough     HPI   William Paige is a 66 year old male who presents to the emergency department today for evaluation of a cough. The patient reports having a very productive cough for one hour prior to arrival, that could not be stopped and was accompanied by vomiting. He also reports some burning pain in his groin this evening, even without urination, and a sore throat. The patient reports one week ago he was diagnosed with pink eye and bronchitis. He also had some flank pain which lasted two days and resolved on its own. He was seen by his PCP for this flank pain who told the patient it was likely from a kidney stone. Patient denies any pain with inspiration or fevers, but does admit to taking a considerable amount of OTC antipyretics. The patient has no other concerns at this time.      Allergies:  No Known Drug Allergies      Medications:    LISINOPRIL PO   VENLAFAXINE HCL PO     Past Medical History:    Cancer  Hypercholesterolemia   Hypertension     Past Surgical History:    Hernia repair     Family History:    History reviewed. No pertinent family history.      Social History:  The patient was accompanied to the ED by himself.  Smoking Status: Never smoker   Smokeless Tobacco: Never used   Alcohol Use: No    Marital Status:  Single      Review of Systems   Constitutional: Negative for fever.   HENT: Positive for sore throat.    Respiratory: Positive for cough.    Gastrointestinal: Positive for vomiting.   All other systems reviewed and are negative.    Physical Exam     Patient Vitals for the past 24 hrs:   BP Temp Temp src Pulse Resp SpO2 Height Weight   08/16/17 2342 - - - - - 96 % - -   08/16/17 2310 158/78 - - 88 20 96 % - -   08/16/17 2140 167/79 98.7  F (37.1  C) Oral 90 18 98 % 1.727 m (5' 8\") 90.3 kg (199 lb)        Physical Exam  Constitutional:  Appears well-developed and well-nourished. Cooperative.   HENT:    M  Head:    Atraumatic.   Mouth/Throat:   Posterior " oropharynx is mildly erythematous but without exudate and mucous    membranes are moist.   Eyes:    Conjunctivae normal and EOM are normal.      Pupils are equal, round, and reactive to light.   Neck:    Normal range of motion. Neck supple.   Cardiovascular:  Normal rate, regular rhythm, normal heart sounds and radial and    dorsalis pedis pulses are 2+ and symmetric.    Pulmonary/Chest:  Diminished breath sounds both bases. Frequent coughing. No wheezing or rales.   Abdominal:   Soft. Bowel sounds are normal.      No splenomegaly or hepatomegaly. Mild tenderness to left lower and middle abdomen. No rebound.   Musculoskeletal:  Normal range of motion. No edema and no tenderness.   Neurological:  Alert. Normal strength. No cranial nerve deficit. GCS 15.  Skin:    Skin is warm and dry.   Psychiatric:   Normal mood and affect.    Emergency Department Course     Imaging:  Radiology findings were communicated with the patient who voiced understanding of the findings.    Chest XR, PA & LAT:  IMPRESSION: No acute abnormality.  Report per radiology      CT Abdomen Pelvis w/o Contrast:  IMPRESSION:  1. There is a 0.4 cm distal left ureteral stone causing mild  hydronephrosis.  2. Cholelithiasis.  Report per radiology      Laboratory:  Laboratory findings were communicated with the patient who voiced understanding of the findings.    UA: Lia and cloudy urine. Urine blood large, Protein Albumin urine 30, RBC/HPF >100, o/w WNL      CBC: WBC 10.6, HGB 12.8 (L),   CMP: Glucose 108 (H), Calcium 8.4 (L),  o/w WNL. (Creatinine 0.79)     Bordetella per/paraper PCR: Pending    Interventions:  2256 NS 1,000mL IV   2349 DuoNeb 3mL Nebulization    0021 Morphine 4 mg IV   0023 Deltasone 60 mg PO  0104 Norco 5-325 mg 2 tablets PO   0116 Zithromax 500 mg PO      Emergency Department Course:  Nursing notes and vitals reviewed.  I performed an exam of the patient as documented above.   0113 Patient rechecked and updated on laboratory  and imaging results.   IV was inserted and blood was drawn for laboratory testing, results above.   The patient provided a urine sample here in the emergency department. This was sent for laboratory testing, findings above.   The patient was sent for a Chest XR, PA & LAT, and CT Abdomen Pelvis w/o Contrast while in the emergency department, results above.    I discussed the treatment plan with the patient. They expressed understanding of this plan and consented to discharge. They will be discharged home with instructions for care and follow up. In addition, the patient will return to the emergency department if their symptoms persist, worsen, if new symptoms arise or if there is any concern.  All questions were answered. I personally reviewed the laboratory and imaging results with the Patient and answered all related questions prior to discharge.   Impression & Plan      Medical Decision Making:  William Paige is a 66 year old male who presented to the ED with symptoms consistent with bronchitis. The patient appears non-toxic without evidence of respiratory distress. The patient has normal oxygen saturations with normal work of breathing. There are no signs of serious systemic illness and the patient has normal mentation without confusion. CXR normal without evidence of pneumonia, pleural effusion, or pneumothorax. Doubt PE given patient is not hypoxic or tachycardic  in the setting of URI symptoms. Reviewed with patient that cough and airway hyperreactivity may persist for several weeks. Will treat symptomatically with albuterol inhaler, prednisone and cough suppressant. I discussed the need to seek immediate evaluation for respiratory distress, confusion, fever, or for any other questions or concerns. Recommended follow-up with primary care provider in three to five days if not improving. The patient was in agreement with plan and discharged in satisfactory condition with all questions answered.      The patient  has waves of coughing that result in gagging and are productive of large volumes of sputum. Cough improved with a DuoNeb and dose of IV morphine. Concerned that the patient may have pertussis based on the type of cough, although his symptoms have only been present for about one week. Pertussis swab was sent. He'll be started on azithromycin. He is advised to stay home from work and avoid contact with others as he is contagious, until the pertussis returns.     The patient also presented with unilateral flank and abdominal pain consistent with renal colic. CT confirms a ureteral stone. Pain is controlled with interventions in the Emergency Department. There is no fever or evidence of a urinary tract infection. He is already taking Flomax. Zofran was prescribed for nausea.  I considered other etiologies for these symptoms including AAA and pyelonephritis but these are unlikely given the otherwise normal CT scan and urinalysis.  The patient is instructed to return if increasing pain not controlled with pain meds, vomiting, and fever. Strain urine to look for stone, if detected, submit to urologist for lab analysis. Instructions were given to follow up with urology within one week, sooner if pain continues, as retrieval of the stone may be required for refractory symptoms.    The patient has history of opiate abuse and dependence. He does not want opiates for home. He'll continue his Tessalon pills, and prednisone, and he'll try albuterol and ibuprofen for any chest discomfort with coughing or for kidney stone pain. Of note, he has no pleuritic pain and no dyspnea when not coughing. There is no dyspnea with exertion.     Diagnosis:    ICD-10-CM    1. Cough R05     possible pertussis   2. Renal colic N23    3. Ureterolithiasis N20.1      Disposition:  Discharged to home with the below prescription.    Discharge Medications:  New Prescriptions    ALBUTEROL (ALBUTEROL) 108 (90 BASE) MCG/ACT INHALER    Inhale 2 puffs into  the lungs every 4 hours as needed for shortness of breath / dyspnea    AZITHROMYCIN (ZITHROMAX) 250 MG TABLET    one tablet daily for four more days.    DEXTROMETHORPHAN (DELSYM) 30 MG/5ML LIQUID    Take 10 mLs (60 mg) by mouth 2 times daily    PREDNISONE (DELTASONE) 20 MG TABLET    Take 3 tablets (60 mg) by mouth daily for 4 days     Scribe Disclosure:  I, Ramiro Hidalgo, am serving as a scribe at 10:49 PM on 8/16/2017 to document services personally performed by Zach King MD based on my observations and the provider's statements to me.       Zach King MD  08/17/17 0604

## 2017-08-23 ENCOUNTER — OFFICE VISIT (OUTPATIENT)
Dept: OTHER | Facility: OUTPATIENT CENTER | Age: 67
End: 2017-08-23

## 2017-08-23 DIAGNOSIS — F43.23 ADJUSTMENT DISORDER WITH MIXED ANXIETY AND DEPRESSED MOOD: Primary | ICD-10-CM
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
## 2017-08-23 NOTE — MR AVS SNAPSHOT
After Visit Summary   2017    William Paige    MRN: [id number]           Patient Information     Date Of Birth          1950        Visit Information        Provider Department      2017 9:00 AM Wellington Monge, PhD DEV [facility]        Today's Diagnoses     Adjustment disorder with mixed anxiety and depressed mood    -  1       Follow-ups after your visit        Your next 10 appointments already scheduled     Sep 07, 2017  9:00 AM CDT   INDIVIDUAL THERAPY with Wellington Monge, PhD DEV   [facility] ([facility])    [address]  Mail Code 7521  [facility] [address]   [phone]              Who to contact     Please call your clinic at [phone] to:    Ask questions about your health    Make or cancel appointments    Discuss your medicines    Learn about your test results    Speak to your doctor   If you have compliments or concerns about an experience at your clinic, or if you wish to file a complaint, please contact [facility] Patient Relations at [phone] or email us at [email or web address]         Additional Information About Your Visit        MyChart Information     [employer] is an electronic gateway that provides easy, online access to your medical records. With [employer], you can request a clinic appointment, read your test results, renew a prescription or communicate with your care team.     To sign up for [employer] visit the website at [email or web address]   You will be asked to enter the access code listed below, as well as some personal information. Please follow the directions to create your username and password.     Your access code is: [email or web address]  Expires: 11/15/2017  1:36 AM     Your access code will  in 90 days. If you need help or a new code, please contact your [facility] or call [phone] for assistance.        Care EveryWhere ID      This is your Care EveryWhere ID. This could be used by other organizations to access your [place] medical records  [id number]         Blood Pressure from Last 3 Encounters:   08/17/17 142/83    Weight from Last 3 Encounters:   08/16/17 90.3 kg (199 lb)              We Performed the Following     Individual Psychotherapy (53+ min) [71439]     Psychotherapy Interactive Complexity [87888]        Primary Care Provider Office Phone # Fax #    Anthony Varma [phone]       [facility] [address] [place] [address]        Equal Access to Services     [facility]: Hadii aad ku hadasho Soomaali, waaxda luqadaha, qaybta kaalmada adeegyada, waxmima cotoin hayaan adeeg claribel chang . So [facility] [phone].    ATENCIÓN: Si habla español, tiene a douglass disposición servicios gratuitos de asistencia lingüística. [facility] [phone].    We comply with applicable federal civil rights laws and [place] laws. We do not discriminate on the basis of race, color, national origin, age, disability sex, sexual orientation or gender identity.            Thank you!     Thank you for choosing [facility]  for your care. Our goal is always to provide you with excellent care. Hearing back from our patients is one way we can continue to improve our services. Please take a few minutes to complete the written survey that you may receive in the mail after your visit with us. Thank you!             Your Updated Medication List - Protect others around you: Learn how to safely use, store and throw away your medicines at www.disposemymeds.org.          This list is accurate as of: 8/23/17  9:58 AM.  Always use your most recent med list.                   Brand Name Dispense Instructions for use Diagnosis    albuterol 108 (90 BASE) MCG/ACT Inhaler    albuterol    1 Inhaler    Inhale 2 puffs into the lungs every 4 hours as needed for shortness of breath / dyspnea        azithromycin  250 MG tablet    ZITHROMAX    4 tablet    one tablet daily for four more days.        dextromethorphan 30 MG/5ML liquid    DELSYM    148 mL    Take 10 mLs (60 mg) by mouth 2 times daily        LISINOPRIL PO           VENLAFAXINE HCL PO

## 2017-09-07 ENCOUNTER — OFFICE VISIT (OUTPATIENT)
Dept: OTHER | Facility: OUTPATIENT CENTER | Age: 67
End: 2017-09-07

## 2017-09-07 DIAGNOSIS — F43.23 ADJUSTMENT DISORDER WITH MIXED ANXIETY AND DEPRESSED MOOD: Primary | ICD-10-CM
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
## 2017-09-07 NOTE — MR AVS SNAPSHOT
After Visit Summary   9/7/2017    William Paige    MRN: [id number]           Patient Information     Date Of Birth          1950        Visit Information        Provider Department      9/7/2017 9:00 AM Wellington Monge, PhD DEV [facility]        Today's Diagnoses     Adjustment disorder with mixed anxiety and depressed mood    -  1       Follow-ups after your visit        Your next 10 appointments already scheduled     Sep 21, 2017  9:00 AM CDT   INDIVIDUAL THERAPY with Wellington Monge PhD LP   [facility] ([facility])    [address]  Mail [address] 7521  [facility] [address]   [phone]            Oct 05, 2017  9:00 AM CDT   INDIVIDUAL THERAPY with Wellington Monge PhD LP   [facility] ([facility])    [address]  [address]  [facility] [address]   [phone]            Oct 26, 2017  9:00 AM CDT   INDIVIDUAL THERAPY with Wellington Monge PhD LP   [facility] ([facility])    [address]  [address]  [facility] [address]   [phone]              Who to contact     Please call your clinic at [phone] to:    Ask questions about your health    Make or cancel appointments    Discuss your medicines    Learn about your test results    Speak to your doctor   If you have compliments or concerns about an experience at your clinic, or if you wish to file a complaint, please contact [facility] Patient Relations at [phone] or email us at [email or web address]         Additional Information About Your Visit        MyChart Information     [employer] is an electronic gateway that provides easy, online access to your medical records. With [employer], you can request a clinic appointment, read your test results, renew a prescription or communicate with your care team.     To sign up for [employer] visit the website at [email or web address]   You  will be asked to enter the access code listed below, as well as some personal information. Please follow the directions to create your username and password.     Your access code is: [email or web address]  Expires: 11/15/2017  1:36 AM     Your access code will  in 90 days. If you need help or a new code, please contact your [facility] or call [phone] for assistance.        Care EveryWhere ID     This is your Care EveryWhere ID. This could be used by other organizations to access your [place] medical records  [id number]         Blood Pressure from Last 3 Encounters:   17 142/83    Weight from Last 3 Encounters:   17 90.3 kg (199 lb)              We Performed the Following     Individual Psychotherapy (53+ min) [26253]     Psychotherapy Interactive Complexity [24626]        Primary Care Provider Office Phone # Fax #    Anthony Varma [phone]       [facility] [address] [place] [address]        Equal Access to Services     [facility]: Hadii aad ku hadasho Soomaali, waaxda luqadaha, qaybta kaalmada adeegyada, waxay idiin hayhieu chang . So [facility] [phone].    ATENCIÓN: Si habla español, tiene a douglass disposición servicios gratuitos de asistencia lingüística. Eliame al [phone].    We comply with applicable federal civil rights laws and [place] laws. We do not discriminate on the basis of race, color, national origin, age, disability sex, sexual orientation or gender identity.            Thank you!     Thank you for choosing [facility]  for your care. Our goal is always to provide you with excellent care. Hearing back from our patients is one way we can continue to improve our services. Please take a few minutes to complete the written survey that you may receive in the mail after your visit with us. Thank you!             Your Updated Medication List - Protect others around you:  Learn how to safely use, store and throw away your medicines at www.disposemymeds.org.          This list is accurate as of: 9/7/17  9:56 AM.  Always use your most recent med list.                   Brand Name Dispense Instructions for use Diagnosis    albuterol 108 (90 BASE) MCG/ACT Inhaler    PROAIR HFA    1 Inhaler    Inhale 2 puffs into the lungs every 4 hours as needed for shortness of breath / dyspnea        azithromycin 250 MG tablet    ZITHROMAX    4 tablet    one tablet daily for four more days.        dextromethorphan 30 MG/5ML liquid    DELSYM    148 mL    Take 10 mLs (60 mg) by mouth 2 times daily        LISINOPRIL PO           VENLAFAXINE HCL PO

## 2017-09-21 ENCOUNTER — OFFICE VISIT (OUTPATIENT)
Dept: OTHER | Facility: OUTPATIENT CENTER | Age: 67
End: 2017-09-21

## 2017-09-21 DIAGNOSIS — F43.23 ADJUSTMENT DISORDER WITH MIXED ANXIETY AND DEPRESSED MOOD: Primary | ICD-10-CM

## 2017-09-21 NOTE — MR AVS SNAPSHOT
After Visit Summary   9/21/2017    William Paige    MRN: [id number]           Patient Information     Date Of Birth          1950        Visit Information        Provider Department      9/21/2017 9:00 AM Wellington Monge, PhD DEV [facility]        Today's Diagnoses     Adjustment disorder with mixed anxiety and depressed mood    -  1       Follow-ups after your visit        Your next 10 appointments already scheduled     Oct 05, 2017  9:00 AM CDT   INDIVIDUAL THERAPY with Wellington Monge PhD LP   [facility] ([facility])    [address]  Mail Code 7521  [facility] [address]   [phone]            Oct 26, 2017  9:00 AM CDT   INDIVIDUAL THERAPY with Wellington Monge PhD LP   [place] [facility] ([facility])    [address]  Mail Code 7521  [facility] [address]   [phone]              Who to contact     Please call your clinic at [phone] to:    Ask questions about your health    Make or cancel appointments    Discuss your medicines    Learn about your test results    Speak to your doctor   If you have compliments or concerns about an experience at your clinic, or if you wish to file a complaint, please contact [facility] Patient Relations at [phone] or email us at [email or web address]         Additional Information About Your Visit        MyChart Information     Think [employer] Learning is an electronic gateway that provides easy, online access to your medical records. With [employer] Learning, you can request a clinic appointment, read your test results, renew a prescription or communicate with your care team.     To sign up for easyfoliot visit the website at [email or web address]   You will be asked to enter the access code listed below, as well as some personal information. Please follow the directions to create your username and password.     Your access code is: [email or web address]  Expires:  11/15/2017  1:36 AM     Your access code will  in 90 days. If you need help or a new code, please contact your [facility] or call [phone] for assistance.        Care EveryWhere ID     This is your Care EveryWhere ID. This could be used by other organizations to access your [place] medical records  [id number]         Blood Pressure from Last 3 Encounters:   17 142/83    Weight from Last 3 Encounters:   17 90.3 kg (199 lb)              We Performed the Following     Individual Psychotherapy (53+ min) [05927]     Psychotherapy Interactive Complexity [62229]        Primary Care Provider Office Phone # Fax #    Anthony Varma [phone]       [facility] [address] [place] [address]        Equal Access to Services     LURDES PETERSON : Hadii aad ku hadasho Soomaali, waaxda luqadaha, qaybta kaalmada adeegyada, ina cotoin hayaan catarina chang . So [facility] [phone].    ATENCIÓN: Si habla español, tiene a douglass disposición servicios gratuitos de asistencia lingüística. Llame al [phone].    We comply with applicable federal civil rights laws and [place] laws. We do not discriminate on the basis of race, color, national origin, age, disability sex, sexual orientation or gender identity.            Thank you!     Thank you for choosing [facility]  for your care. Our goal is always to provide you with excellent care. Hearing back from our patients is one way we can continue to improve our services. Please take a few minutes to complete the written survey that you may receive in the mail after your visit with us. Thank you!             Your Updated Medication List - Protect others around you: Learn how to safely use, store and throw away your medicines at www.disposemymeds.org.          This list is accurate as of: 17  9:56 AM.  Always use your most recent med list.                   Brand  Name Dispense Instructions for use Diagnosis    albuterol 108 (90 BASE) MCG/ACT Inhaler    PROAIR HFA    1 Inhaler    Inhale 2 puffs into the lungs every 4 hours as needed for shortness of breath / dyspnea        azithromycin 250 MG tablet    ZITHROMAX    4 tablet    one tablet daily for four more days.        dextromethorphan 30 MG/5ML liquid    DELSYM    148 mL    Take 10 mLs (60 mg) by mouth 2 times daily        LISINOPRIL PO           VENLAFAXINE HCL PO
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
## 2017-10-05 ENCOUNTER — OFFICE VISIT (OUTPATIENT)
Dept: OTHER | Facility: OUTPATIENT CENTER | Age: 67
End: 2017-10-05

## 2017-10-05 DIAGNOSIS — F43.23 ADJUSTMENT DISORDER WITH MIXED ANXIETY AND DEPRESSED MOOD: Primary | ICD-10-CM

## 2017-10-05 NOTE — MR AVS SNAPSHOT
After Visit Summary   10/5/2017    William Paige    MRN: [id number]           Patient Information     Date Of Birth          1950        Visit Information        Provider Department      10/5/2017 9:00 AM Wellington Monge, PhD DEV [facility]        Today's Diagnoses     Adjustment disorder with mixed anxiety and depressed mood    -  1       Follow-ups after your visit        Your next 10 appointments already scheduled     Oct 26, 2017  9:00 AM CDT   INDIVIDUAL THERAPY with Wellington Monge, PhD DEV   [facility] ([facility])    [address]  Mail Code 7521  [facility] [address]   [phone]              Who to contact     Please call your clinic at [phone] to:    Ask questions about your health    Make or cancel appointments    Discuss your medicines    Learn about your test results    Speak to your doctor   If you have compliments or concerns about an experience at your clinic, or if you wish to file a complaint, please contact [facility] Patient Relations at [phone] or email us at [email or web address]         Additional Information About Your Visit        MyChart Information     hulu is an electronic gateway that provides easy, online access to your medical records. With hulu, you can request a clinic appointment, read your test results, renew a prescription or communicate with your care team.     To sign up for hulu visit the website at [email or web address]   You will be asked to enter the access code listed below, as well as some personal information. Please follow the directions to create your username and password.     Your access code is: [email or web address]  Expires: 11/15/2017  1:36 AM     Your access code will  in 90 days. If you need help or a new code, please contact your [facility] or call [phone] for assistance.        Care EveryWhere ID      This is your Care EveryWhere ID. This could be used by other organizations to access your [place] medical records  [id number]         Blood Pressure from Last 3 Encounters:   08/17/17 142/83    Weight from Last 3 Encounters:   08/16/17 90.3 kg (199 lb)              We Performed the Following     Individual Psychotherapy (53+ min) [49780]     Psychotherapy Interactive Complexity [10659]        Primary Care Provider Office Phone # Fax #    Anthony Varma [phone]       [facility] [address] [place] [address]        Equal Access to Services     [facility]: Hadii aad ku hadasho Soomaali, waaxda luqadaha, qaybta kaalmada adeegyada, waxay nnamdiin hayaan adeeg claribel chang . So [facility] [phone].    ATENCIÓN: Si habla español, tiene a douglass disposición servicios gratuitos de asistencia lingüística. Ll[facility] [phone].    We comply with applicable federal civil rights laws and [place] laws. We do not discriminate on the basis of race, color, national origin, age, disability, sex, sexual orientation, or gender identity.            Thank you!     Thank you for choosing [place] [facility]  for your care. Our goal is always to provide you with excellent care. Hearing back from our patients is one way we can continue to improve our services. Please take a few minutes to complete the written survey that you may receive in the mail after your visit with us. Thank you!             Your Updated Medication List - Protect others around you: Learn how to safely use, store and throw away your medicines at www.disposemymeds.org.          This list is accurate as of: 10/5/17  9:57 AM.  Always use your most recent med list.                   Brand Name Dispense Instructions for use Diagnosis    albuterol 108 (90 BASE) MCG/ACT Inhaler    PROAIR HFA    1 Inhaler    Inhale 2 puffs into the lungs every 4 hours as needed for shortness of breath / dyspnea         azithromycin 250 MG tablet    ZITHROMAX    4 tablet    one tablet daily for four more days.        dextromethorphan 30 MG/5ML liquid    DELSYM    148 mL    Take 10 mLs (60 mg) by mouth 2 times daily        LISINOPRIL PO           VENLAFAXINE HCL PO

## 2017-10-05 NOTE — PROGRESS NOTES
[facility] -  Case Progress Note    Date of Service: 10/05/17  Client Name: William Paige  YOB: 1950  MRN:  [id number]  Treating Provider: Wellington Monge, PhD LP  Type of Session: Individual  Present in Session: client only  Number of Minutes:  53    Current Symptoms/Status:  Client has been experiencing notable anxiety and depression in response to recent stressors, which has interfered with personal well-being and interpersonal functioning.      Progress Toward Treatment Goals:   He has been actively processing his emotions. Sal has had a lot of stress. He has not joined [employer].    Intervention: Modality and Description:  CBT and psychodynamic techniques were used to help explore the client's emotions and sexuality. Sal reported that he lost his job at [employer]. 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 He has applied for two other jobs and received offers. One is a part-time job 3 days per week at [employer]. The other job is full-time with [employer] at [employer], an LGBT clients. Both places use the computer system he has used before. Sal worries about losing another job in a short span of time. He is leaning toward the full-time job for the money and for the opportunity to grow in his job. Therapist urged Sal to get an evaluation for his cognition--to see a neuropsychologist. He was given Corazon Kirk's name. Therapist noted that he could have ADHD, given how he jumps around in session. He understood. Time was spent talking about his conflicting feelings and agism. Switching topics, Sal talked about how he wants to live more. He wants to date. He thinks his mother's influence makes it difficult for him to be sexual with men. She was Mormon. He thinks he developed a schema that sex with men was wrong. Sal admitted that he has good sexual experiences with Glen and Fletchre. Therapist wondered if Sal is more concerned about whether or not he  can have the kind of sex he wants.    Response to Intervention:  Open. Verbal. Took feedback. Tangential. He would jump topics at times.    Interactive Complexity    There are four specific communication difficulties that complicate the work of the primary psychiatric procedure.  Interactive complexity (+25752) may be reported when at least one of these difficulties is present.    Communication difficulties present during current the psychiatric procedure include:  1. The need to manage maladaptive communication among participants that complicates delivery of care. Sal was fairly tangential today, making it difficult to follow him and requiring redirection.    Assignment:  Maintain self-care. Join dating websites. Prepare to talk about his mother and trauma. Past tasks: Write memories of his mother's homonegativity. Start keeping a dream log.      Diagnosis:  309.28 - Adjustment Disorder with Mixed Anxiety and Depressed Mood      Plan / Need for Future Services:  Return for therapy in 2 weeks.      Wellington Monge, PhD LP

## 2017-10-26 ENCOUNTER — OFFICE VISIT (OUTPATIENT)
Dept: OTHER | Facility: OUTPATIENT CENTER | Age: 67
End: 2017-10-26

## 2017-10-26 DIAGNOSIS — F43.23 ADJUSTMENT DISORDER WITH MIXED ANXIETY AND DEPRESSED MOOD: Primary | ICD-10-CM

## 2017-10-26 NOTE — PROGRESS NOTES
[place] [facility] -  Case Progress Note    Date of Service: 10/26/17  Client Name: William Paige  YOB: 1950  MRN:  [id number]  Treating Provider: Wellington Monge, PhD LP  Type of Session: Individual  Present in Session: client only (medical student Mercedez was present)  Number of Minutes:  53    Current Symptoms/Status:  Client has been experiencing notable anxiety and depression in response to recent stressors, which has interfered with personal well-being and interpersonal functioning.      Progress Toward Treatment Goals:   He has been actively processing his emotions.    Intervention: Modality and Description:  CBT and psychodynamic techniques were used to help explore the client's emotions and sexuality. Sal took the full-time job at [employer]. He has been taking steps to minimize or avoid work problems. Sal hopes to join a [employer]o men's [employer]. He has to move and is planning to move in with Michael, a villatoro overweight hoarder. Sal thinks he would live there 2 months. Time was spent talking about his living situation and moving plans. He is still interested in dating Teodoro. He wondered about dating a former co-worker who has a PsyD ([employer]). Therapist wondered if he was ready to date, given all of the changes in his life. Sal misses intimacy. He described an experience in which he had intimacy. He described his plans for coffee with [place]. Therapist spent time encouraging Sal to practice good communication. The notion of taking risks was discussed.    Response to Intervention:  Open. Verbal. Took feedback. Tangential. He would jump topics at times.    Interactive Complexity    There are four specific communication difficulties that complicate the work of the primary psychiatric procedure.  Interactive complexity (+77561) may be reported when at least one of these difficulties is present.    Communication difficulties present during current the psychiatric procedure include:  1. The need to  manage maladaptive communication among participants that complicates delivery of care. Sal was fairly tangential today, making it difficult to follow him and requiring redirection.    Assignment:  Maintain self-care. Join dating websites. Prepare to talk about his mother and trauma. Past tasks: Write memories of his mother's homonegativity. Start keeping a dream log.      Diagnosis:  309.28 - Adjustment Disorder with Mixed Anxiety and Depressed Mood      Plan / Need for Future Services:  Return for therapy in 2 weeks.      Wellington Monge, PhD LP

## 2017-10-26 NOTE — MR AVS SNAPSHOT
After Visit Summary   10/26/2017    William Paige    MRN: [id number]           Patient Information     Date Of Birth          1950        Visit Information        Provider Department      10/26/2017 9:00 AM Wellington Monge, PhD DEV [facility]        Today's Diagnoses     Adjustment disorder with mixed anxiety and depressed mood    -  1       Follow-ups after your visit        Your next 10 appointments already scheduled     Nov 06, 2017  9:00 AM CST   INDIVIDUAL THERAPY with Wellington Monge PhD LP   [facility] ([facility])    [address]  Mail Code 7521  [facility] [address]   [phone]            Nov 20, 2017  9:00 AM CST   INDIVIDUAL THERAPY with Wellington Monge PhD LP    [facility] ([facility])    [address]  Mail Code 7521  [facility] [address]   [phone]              Who to contact     Please call your clinic at [phone] to:    Ask questions about your health    Make or cancel appointments    Discuss your medicines    Learn about your test results    Speak to your doctor   If you have compliments or concerns about an experience at your clinic, or if you wish to file a complaint, please contact [facility] Patient Relations at [phone] or email us at [email or web address] [email or web address]         Additional Information About Your Visit        MyChart Information     Sur[employer]Air is an electronic gateway that provides easy, online access to your medical records. With [employer], you can request a clinic appointment, read your test results, renew a prescription or communicate with your care team.     To sign up for Smart Picture Techt visit the website at [email or web address]   You will be asked to enter the access code listed below, as well as some personal information. Please follow the directions to create your username and password.     Your access code is: [email or web address]  Expires:  11/15/2017  1:36 AM     Your access code will  in 90 days. If you need help or a new code, please contact your [facility] or call [phone] for assistance.        Care EveryWhere ID     This is your Care EveryWhere ID. This could be used by other organizations to access your [place] medical records  [id number]         Blood Pressure from Last 3 Encounters:   17 142/83    Weight from Last 3 Encounters:   17 90.3 kg (199 lb)              We Performed the Following     Individual Psychotherapy (53+ min) [69826]     Psychotherapy Interactive Complexity [23740]        Primary Care Provider Office Phone # Fax #    Anthony Varma [phone]       [facility] [address] [place] [address]        Equal Access to Services     LURDES PETERSON : Hadii aad ku hadasho Soomaali, waaxda luqadaha, qaybta kaalmada adeegyada, ina cotoin hayaan catarina chang . So [facility] [phone].    ATENCIÓN: Si habla español, tiene a douglass disposición servicios gratuitos de asistencia lingüística. Llame al [phone].    We comply with applicable federal civil rights laws and [place] laws. We do not discriminate on the basis of race, color, national origin, age, disability, sex, sexual orientation, or gender identity.            Thank you!     Thank you for choosing [place] [facility]  for your care. Our goal is always to provide you with excellent care. Hearing back from our patients is one way we can continue to improve our services. Please take a few minutes to complete the written survey that you may receive in the mail after your visit with us. Thank you!             Your Updated Medication List - Protect others around you: Learn how to safely use, store and throw away your medicines at www.disposemymeds.org.          This list is accurate as of: 10/26/17  9:53 AM.  Always use your most recent med list.                    Brand Name Dispense Instructions for use Diagnosis    albuterol 108 (90 BASE) MCG/ACT Inhaler    PROAIR HFA    1 Inhaler    Inhale 2 puffs into the lungs every 4 hours as needed for shortness of breath / dyspnea        azithromycin 250 MG tablet    ZITHROMAX    4 tablet    one tablet daily for four more days.        dextromethorphan 30 MG/5ML liquid    DELSYM    148 mL    Take 10 mLs (60 mg) by mouth 2 times daily        LISINOPRIL PO           VENLAFAXINE HCL PO

## 2017-10-30 ENCOUNTER — TELEPHONE (OUTPATIENT)
Dept: OTHER | Facility: OUTPATIENT CENTER | Age: 67
End: 2017-10-30

## 2017-11-22 ENCOUNTER — OFFICE VISIT (OUTPATIENT)
Dept: NEUROPSYCHOLOGY | Facility: CLINIC | Age: 67
End: 2017-11-22

## 2017-11-22 DIAGNOSIS — G93.40 ENCEPHALOPATHY: Primary | ICD-10-CM

## 2017-11-22 DIAGNOSIS — F33.0 MILD RECURRENT MAJOR DEPRESSION (H): ICD-10-CM

## 2017-11-22 NOTE — MR AVS SNAPSHOT
After Visit Summary   11/22/2017    William Paige    MRN: [id number]           Patient Information     Date Of Birth          1950        Visit Information        Provider Department      11/22/2017 12:30 PM Mckenzie Kirk LP [facility] Neuro[facility]ology        Today's Diagnoses     Encephalopathy    -  1    Mild recurrent major depression (H)           Follow-ups after your visit        Your next 10 appointments already scheduled     Nov 28, 2017  5:00 PM CST   INDIVIDUAL THERAPY with Wellington Monge PhD LP   [facility] ([facility])    [address]  Mail [address] 7521  [facility] [address]   [phone]            Dec 05, 2017  6:00 PM CST   INDIVIDUAL THERAPY with Wellington Monge PhD LP   [facility] ([facility])    [address]  [address]  [facility] [address]   [phone]            Dec 19, 2017  6:00 PM CST   INDIVIDUAL THERAPY with Wellington Monge PhD LP   [facility] ([facility])    [address]  Mail [address] 7521  [facility] [address]   [phone]              Who to contact     Please call your clinic at [phone] to:    Ask questions about your health    Make or cancel appointments    Discuss your medicines    Learn about your test results    Speak to your doctor   If you have compliments or concerns about an experience at your clinic, or if you wish to file a complaint, please contact [facility] Patient Relations at [phone] or email us at [email or web address]         Additional Information About Your Visit        MyChart Information     [employer] is an electronic gateway that provides easy, online access to your medical records. With [employer], you can request a clinic appointment, read your test results, renew a prescription or communicate with your care team.     To sign up for [employer] visit the website at [email or web address]    You will be asked to enter the access code listed below, as well as some personal information. Please follow the directions to create your username and password.     Your access code is: [email or web address]  Expires: 2018  4:19 PM     Your access code will  in 90 days. If you need help or a new code, please contact your [facility] or call [phone] for assistance.        Care EveryWhere ID     This is your Care EveryWhere ID. This could be used by other organizations to access your [place] medical records  [id number]         Blood Pressure from Last 3 Encounters:   17 142/83    Weight from Last 3 Encounters:   17 90.3 kg (199 lb)              We Performed the Following     [id number] TESTING, PER HR/PSYCHOLOGIST     NEUROPSYCH TESTING BY [facility]        Primary Care Provider Office Phone # Fax #    Anthony Varma [phone]       [facility] [address] [place] [address]        Equal Access to Services     LURDES PETERSON AH: Hadii aad ku hadasho Soomaali, waaxda luqadaha, qaybta kaalmada adeegyada, waxay rupesh hayaan catarina chang . So [facility] [phone].    ATENCIÓN: Si habla español, tiene a douglass disposición servicios gratuitos de asistencia lingüística. Llame al [phone].    We comply with applicable federal civil rights laws and [place] laws. We do not discriminate on the basis of race, color, national origin, age, disability, sex, sexual orientation, or gender identity.            Thank you!     Thank you for choosing [facility] NEUROPSYCHOLOGY  for your care. Our goal is always to provide you with excellent care. Hearing back from our patients is one way we can continue to improve our services. Please take a few minutes to complete the written survey that you may receive in the mail after your visit with us. Thank you!             Your Updated Medication List - Protect others around you: Learn how  to safely use, store and throw away your medicines at www.disposemymeds.org.          This list is accurate as of: 11/22/17 11:59 PM.  Always use your most recent med list.                   Brand Name Dispense Instructions for use Diagnosis    albuterol 108 (90 BASE) MCG/ACT Inhaler    PROAIR HFA    1 Inhaler    Inhale 2 puffs into the lungs every 4 hours as needed for shortness of breath / dyspnea        azithromycin 250 MG tablet    ZITHROMAX    4 tablet    one tablet daily for four more days.        dextromethorphan 30 MG/5ML liquid    DELSYM    148 mL    Take 10 mLs (60 mg) by mouth 2 times daily        LISINOPRIL PO           VENLAFAXINE HCL PO

## 2017-11-22 NOTE — PROGRESS NOTES
The patient was seen for neuropsychological evaluation at the request of Wellington Monge MD for the purposes of diagnostic clarification and treatment planning.  1.75 hours of face-to-face testing were provided by this writer.  Please see Dr. Mckenzie Kirk's report for a full interpretation of the findings.

## 2017-11-27 NOTE — PROGRESS NOTES
STOVALL ORIENTATION TEST WAIS-IV                                    Raw           Age Scaled    Score          100  Vocabulary  53    15      Block Design  24      8    WECHSLER MEMORY SCALES Coding  69    13      Immed.   30 Min Digit Span  22      8    Logical Memory      11/8        8/4  Comprehension  31    14    Visual Reproduction   9    7     30 Minute Recognition   3  PSYCHOMOTOR TESTS       SHELLY AUDITORY VERBAL LEARNING TEST  Right    Left    Grooved Pegboard  172    173       I   II           III IV          V           VI VII  Drops: 5         Drops: 7     3    8      7      6      9      4      6      MAKE A FIGURE TEST    30 Minute Recall    5        30 Minute Recognition  14      Score    25    Intrusions    7      CONTROLLED WORD ASSOCIATION TEST   LETTER CANCELLATION TEST     Score  40     Time  152   Errors     7      TOKEN TEST   STILL-GESTALT (3-figure)     Score          160    Recall  1         BOSTON NAMING TEST        Score  55         WISCONSIN CARD SORTING TEST-1 deck            # of categories   1
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
## 2017-11-28 ENCOUNTER — OFFICE VISIT (OUTPATIENT)
Dept: OTHER | Facility: OUTPATIENT CENTER | Age: 67
End: 2017-11-28

## 2017-11-28 DIAGNOSIS — F43.23 ADJUSTMENT DISORDER WITH MIXED ANXIETY AND DEPRESSED MOOD: Primary | ICD-10-CM

## 2017-11-28 NOTE — PROGRESS NOTES
[facility]  [facility] Progress Note    Date of Service: 11/28/17  Client Name: William Paige  YOB: 1950  MRN:  [id number]  Treating Provider: Wellington Monge, PhD LP  Type of Session: Individual  Present in Session: client only  Number of Minutes:  30 (he arrived late)    Current Symptoms/Status:  Client has been experiencing notable anxiety and depression in response to recent stressors, which has interfered with personal well-being and interpersonal functioning.      Progress Toward Treatment Goals:   He has been actively processing his emotions.    Intervention: Modality and Description:  CBT and psychodynamic techniques were used to help explore the client's emotions and sexuality. He was not aware that he missed his last appointment. Sal is feeling frantic due to his full-time job at [employer]. He has been enjoying the therapy he is doing and denied being overwhelmed by this. He has been there 3 weeks thus far. No one has complained about his work. After talking, Sal clarified that he has only felt frantic at work today--not any other days. His co-therapist is leaving. He is getting a new hearing aid and wishes that his female coworkers would be more sensitive to his hearing difficulties. Upon Sal's request, time was spent reviewing his neuropsychological test results. He was encouraged to send Dr. Kirk a Qgiv message to obtain referrals so he can follow her recommendations.    Response to Intervention:  Open. Verbal. Took feedback. Tangential. He would jump topics at times.    Interactive Complexity    There are four specific communication difficulties that complicate the work of the primary psychiatric procedure.  Interactive complexity (+61224) may be reported when at least one of these difficulties is present.    Communication difficulties present during current the psychiatric procedure include:  1. The need to manage maladaptive communication among participants that  complicates delivery of care. Sal was fairly tangential today, making it difficult to follow him and requiring redirection.    Assignment:  Maintain self-care. Past tasks: Join dating websites. Prepare to talk about his mother and trauma. Past tasks: Write memories of his mother's homonegativity. Start keeping a dream log.      Diagnosis:  309.28 - Adjustment Disorder with Mixed Anxiety and Depressed Mood      Plan / Need for Future Services:  Return for therapy in 2 weeks.      Wellington Monge, PhD LP

## 2017-11-28 NOTE — MR AVS SNAPSHOT
After Visit Summary   11/28/2017    William Paige    MRN: [id number]           Patient Information     Date Of Birth          1950        Visit Information        Provider Department      11/28/2017 5:00 PM Wellington Monge, PhD DEV [facility]        Today's Diagnoses     Adjustment disorder with mixed anxiety and depressed mood    -  1       Follow-ups after your visit        Your next 10 appointments already scheduled     Dec 05, 2017  6:00 PM CST   INDIVIDUAL THERAPY with Wellington Monge PhD LP   [facility] ([facility])    [address]  Mail Code 7521  [facility] [address]   [phone]            Dec 19, 2017  6:00 PM CST   INDIVIDUAL THERAPY with Wellington Monge PhD LP   [facility] ([facility])    [address]  Mail [address] 7521  [facility] [address]   [phone]              Who to contact     Please call your clinic at [phone] to:    Ask questions about your health    Make or cancel appointments    Discuss your medicines    Learn about your test results    Speak to your doctor   If you have compliments or concerns about an experience at your clinic, or if you wish to file a complaint, please contact [facility] Patient Relations at [phone] or email us at [email or web address]         Additional Information About Your Visit        MyChart Information     Acunu is an electronic gateway that provides easy, online access to your medical records. With Acunu, you can request a clinic appointment, read your test results, renew a prescription or communicate with your care team.     To sign up for ApniCuret visit the website at [email or web address]   You will be asked to enter the access code listed below, as well as some personal information. Please follow the directions to create your username and password.     Your access code is: [email or web address]  Expires:  2018  4:19 PM     Your access code will  in 90 days. If you need help or a new code, please contact your [facility] or call [phone] for assistance.        Care EveryWhere ID     This is your Care EveryWhere ID. This could be used by other organizations to access your [place] medical records  [id number]         Blood Pressure from Last 3 Encounters:   17 142/83    Weight from Last 3 Encounters:   17 90.3 kg (199 lb)              We Performed the Following     Individual Psychotherapy (16-37 min) [10628]     Psychotherapy Interactive Complexity [27776]        Primary Care Provider Office Phone # Fax #    Anthony Varma [phone]       [facility] [address] [place] [address]        Equal Access to Services     LURDES PETERSON : Hadii aad ku hadasho Soomaali, waaxda luqadaha, qaybta kaalmada adeegyada, ina cotoin hayaan catarina chang . So [facility] [phone].    ATENCIÓN: Si habla español, tiene a douglass disposición servicios gratuitos de asistencia lingüística. Llame al [phone].    We comply with applicable federal civil rights laws and [place] laws. We do not discriminate on the basis of race, color, national origin, age, disability, sex, sexual orientation, or gender identity.            Thank you!     Thank you for choosing [facility]  for your care. Our goal is always to provide you with excellent care. Hearing back from our patients is one way we can continue to improve our services. Please take a few minutes to complete the written survey that you may receive in the mail after your visit with us. Thank you!             Your Updated Medication List - Protect others around you: Learn how to safely use, store and throw away your medicines at www.disposemymeds.org.          This list is accurate as of: 17  5:56 PM.  Always use your most recent med list.                    Brand Name Dispense Instructions for use Diagnosis    albuterol 108 (90 BASE) MCG/ACT Inhaler    PROAIR HFA    1 Inhaler    Inhale 2 puffs into the lungs every 4 hours as needed for shortness of breath / dyspnea        azithromycin 250 MG tablet    ZITHROMAX    4 tablet    one tablet daily for four more days.        dextromethorphan 30 MG/5ML liquid    DELSYM    148 mL    Take 10 mLs (60 mg) by mouth 2 times daily        LISINOPRIL PO           VENLAFAXINE HCL PO

## 2017-12-01 ENCOUNTER — TELEPHONE (OUTPATIENT)
Dept: OTHER | Facility: OUTPATIENT CENTER | Age: 67
End: 2017-12-01

## 2017-12-19 ENCOUNTER — OFFICE VISIT (OUTPATIENT)
Dept: OTHER | Facility: OUTPATIENT CENTER | Age: 67
End: 2017-12-19

## 2017-12-19 DIAGNOSIS — F43.23 ADJUSTMENT DISORDER WITH MIXED ANXIETY AND DEPRESSED MOOD: Primary | ICD-10-CM

## 2017-12-19 NOTE — MR AVS SNAPSHOT
After Visit Summary   12/19/2017    William Paige    MRN: [id number]           Patient Information     Date Of Birth          1950        Visit Information        Provider Department      12/19/2017 6:00 PM Wellington Monge, PhD [facility]        Today's Diagnoses     Adjustment disorder with mixed anxiety and depressed mood    -  1       Follow-ups after your visit        Your next 10 appointments already scheduled     Jan 02, 2018  4:00 PM CST   INDIVIDUAL THERAPY with Wellington Monge PhD LP   [facility] ([facility])    [address]  Mail [address] 7521  [facility] [address]   [phone]            Jan 16, 2018  4:00 PM CST   INDIVIDUAL THERAPY with Wellington Monge PhD LP   [facility] ([facility])    [address]  Mail Code 7521  [facility] [address]   [phone]            Jan 30, 2018  5:00 PM CST   INDIVIDUAL THERAPY with Wellington Monge PhD LP   [facility] ([facility])    [address]  Mail Code 7521  [facility] [address]   [phone]              Who to contact     Please call your clinic at [phone] to:    Ask questions about your health    Make or cancel appointments    Discuss your medicines    Learn about your test results    Speak to your doctor   If you have compliments or concerns about an experience at your clinic, or if you wish to file a complaint, please contact [facility] Patient Relations at [phone] or email us at [email or web address]         Additional Information About Your Visit        MyChart Information     Qual Canal is an electronic gateway that provides easy, online access to your medical records. With Qual Canal, you can request a clinic appointment, read your test results, renew a prescription or communicate with your care team.     To sign up for Trovert visit the website at [email or web address]    You will be asked to enter the access code listed below, as well as some personal information. Please follow the directions to create your username and password.     Your access code is: [email or web address]  Expires: 2018  4:19 PM     Your access code will  in 90 days. If you need help or a new code, please contact your [facility] or call [phone] for assistance.        Care EveryWhere ID     This is your Care EveryWhere ID. This could be used by other organizations to access your [place] medical records  [id number]         Blood Pressure from Last 3 Encounters:   17 142/83    Weight from Last 3 Encounters:   17 90.3 kg (199 lb)              We Performed the Following     Individual Psychotherapy (53+ min) [93079]     Psychotherapy Interactive Complexity [37757]        Primary Care Provider Office Phone # Fax #    Anthony Varma [phone]       [facility] [address] [place] [address]        Equal Access to Services     [facility]: Hadii aad ku hadasho Soomaali, waaxda luqadaha, qaybta kaalmada adeegyada, waxay rupesh hayhieu chang . So [facility] [phone].    ATENCIÓN: Si habla español, tiene a douglass disposición servicios gratuitos de asistencia lingüística. Juan al [phone].    We comply with applicable federal civil rights laws and [place] laws. We do not discriminate on the basis of race, color, national origin, age, disability, sex, sexual orientation, or gender identity.            Thank you!     Thank you for choosing [facility]  for your care. Our goal is always to provide you with excellent care. Hearing back from our patients is one way we can continue to improve our services. Please take a few minutes to complete the written survey that you may receive in the mail after your visit with us. Thank you!             Your Updated Medication List - Protect others around  you: Learn how to safely use, store and throw away your medicines at www.disposemymeds.org.          This list is accurate as of: 12/19/17  6:54 PM.  Always use your most recent med list.                   Brand Name Dispense Instructions for use Diagnosis    albuterol 108 (90 BASE) MCG/ACT Inhaler    PROAIR HFA    1 Inhaler    Inhale 2 puffs into the lungs every 4 hours as needed for shortness of breath / dyspnea        azithromycin 250 MG tablet    ZITHROMAX    4 tablet    one tablet daily for four more days.        dextromethorphan 30 MG/5ML liquid    DELSYM    148 mL    Take 10 mLs (60 mg) by mouth 2 times daily        LISINOPRIL PO           VENLAFAXINE HCL PO

## 2017-12-20 NOTE — PROGRESS NOTES
"[facility] -  Case Progress Note    Date of Service: 12/19/17  Client Name: William Paige  YOB: 1950  MRN:  [id number]  Treating Provider: Wellington Monge, PhD LP  Type of Session: Individual  Present in Session: client only  Number of Minutes:  53    Current Symptoms/Status:  Client has been experiencing notable anxiety and depression in response to recent stressors, which has interfered with personal well-being and interpersonal functioning.      Progress Toward Treatment Goals:   He has been actively processing his emotions. He has not contacted Dr. Kirk for referrals.    Intervention: Modality and Description:  CBT and psychodynamic techniques were used to help explore the client's emotions and sexuality. Sal revealed that he has been having trouble hearing due to his lack of hearing aids in the last week, which has been very upsetting to him. He first had hearing aids in 1995. He has been using low end hearing aids for 2 years. Sal will have better hearing aids for less money via [employer] soon. Michael is loaning him money for this. Therapist advised him to no longer be \"cheap\" about hearing aids. This was processed. He described a client who has been difficult. Sal has raised his voice with the client on two occasions now. In the first incident, the client was yelling at Sal. Per Sal, the client is accusatory with many providers. Sal thinks he took the yelling too personally. The client is , 30s, and possibly bipolar with polysubstance abuse (mostly alcohol). This writer helped Sal explore possible dynamics or issues associated with this client. He talked about his interaction with a transgender client. This writer advised Sal to avoid taking an authoritative approach to therapy. He agreed. Sal wonders if he has an anger management problem. He described being irritable with Michael. Therapist noted that Sal should not expect Michael to follow rules when the home " belongs to Michael. Sal was encouraged to be more aware of his counter-transference. He talked about having joined [employer], an internet dating site. Although the details were unclear, Sal talked about his concern of using porn for masturbation.       Response to Intervention:  Open. Verbal. Took feedback. Tangential. Tearful at times. He would jump topics at times.    Interactive Complexity    There are four specific communication difficulties that complicate the work of the primary psychiatric procedure.  Interactive complexity (+86325) may be reported when at least one of these difficulties is present.    Communication difficulties present during current the psychiatric procedure include:  1. The need to manage maladaptive communication among participants that complicates delivery of care. Sal was fairly tangential today, making it difficult to follow him and requiring redirection.    Assignment:  Maintain self-care. Contact Dr. Kirk for referrals. Past tasks: Join dating websites. Prepare to talk about his mother and trauma. Write memories of his mother's homonegativity. Start keeping a dream log.      Diagnosis:  309.28 - Adjustment Disorder with Mixed Anxiety and Depressed Mood      Plan / Need for Future Services:  Return for therapy in 2 weeks.      Wellington Monge, PhD LP

## 2018-01-02 ENCOUNTER — OFFICE VISIT (OUTPATIENT)
Dept: OTHER | Facility: OUTPATIENT CENTER | Age: 68
End: 2018-01-02
Payer: COMMERCIAL

## 2018-01-02 DIAGNOSIS — F43.23 ADJUSTMENT DISORDER WITH MIXED ANXIETY AND DEPRESSED MOOD: Primary | ICD-10-CM

## 2018-01-02 NOTE — MR AVS SNAPSHOT
After Visit Summary   1/2/2018    William Paige    MRN: [id number]           Patient Information     Date Of Birth          1950        Visit Information        Provider Department      1/2/2018 4:00 PM Wellington Monge, PhD [facility]        Today's Diagnoses     Adjustment disorder with mixed anxiety and depressed mood    -  1       Follow-ups after your visit        Your next 10 appointments already scheduled     Jan 16, 2018  4:00 PM CST   INDIVIDUAL THERAPY with Wellington Monge PhD LP   [facility] ([facility])    [address]  Mail [address] 7521  [facility] [address]   [phone]            Jan 30, 2018  5:00 PM CST   INDIVIDUAL THERAPY with Wellington Monge PhD LP   [facility] ([facility])    [address]  Mail [address] 7521  [facility] [address]   [phone]              Who to contact     Please call your clinic at [phone] to:    Ask questions about your health    Make or cancel appointments    Discuss your medicines    Learn about your test results    Speak to your doctor   If you have compliments or concerns about an experience at your clinic, or if you wish to file a complaint, please contact [facility] Patient Relations at [phone] or email us at [email or web address]         Additional Information About Your Visit        MyChart Information     Kiva Systems is an electronic gateway that provides easy, online access to your medical records. With Kiva Systems, you can request a clinic appointment, read your test results, renew a prescription or communicate with your care team.     To sign up for Gemisimot visit the website at [email or web address]   You will be asked to enter the access code listed below, as well as some personal information. Please follow the directions to create your username and password.     Your access code is: [email or web address]  Expires: 2/25/2018   4:19 PM     Your access code will  in 90 days. If you need help or a new code, please contact your [facility] or call [phone] for assistance.        Care EveryWhere ID     This is your Care EveryWhere ID. This could be used by other organizations to access your [place] medical records  [id number]         Blood Pressure from Last 3 Encounters:   17 142/83    Weight from Last 3 Encounters:   17 90.3 kg (199 lb)              We Performed the Following     Individual Psychotherapy (53+ min) [45598]     Psychotherapy Interactive Complexity [67590]        Primary Care Provider Office Phone # Fax #    Anthony Varma [phone]       [facility] [address] [place] [address]        Equal Access to Services     LURDES PETERSON : Hadii aad ku hadasho Soomaali, waaxda luqadaha, qaybta kaalmada adeegyada, ina goddard hayhieu chang . So [facility] [phone].    ATENCIÓN: Si habla español, tiene a douglass disposición servicios gratuitos de asistencia lingüística. [facility] [phone].    We comply with applicable federal civil rights laws and [place] laws. We do not discriminate on the basis of race, color, national origin, age, disability, sex, sexual orientation, or gender identity.            Thank you!     Thank you for choosing [facility]  for your care. Our goal is always to provide you with excellent care. Hearing back from our patients is one way we can continue to improve our services. Please take a few minutes to complete the written survey that you may receive in the mail after your visit with us. Thank you!             Your Updated Medication List - Protect others around you: Learn how to safely use, store and throw away your medicines at www.disposemymeds.org.          This list is accurate as of: 18  4:58 PM.  Always use your most recent med list.                   Brand Name  Dispense Instructions for use Diagnosis    albuterol 108 (90 BASE) MCG/ACT Inhaler    PROAIR HFA    1 Inhaler    Inhale 2 puffs into the lungs every 4 hours as needed for shortness of breath / dyspnea        azithromycin 250 MG tablet    ZITHROMAX    4 tablet    one tablet daily for four more days.        dextromethorphan 30 MG/5ML liquid    DELSYM    148 mL    Take 10 mLs (60 mg) by mouth 2 times daily        LISINOPRIL PO           VENLAFAXINE HCL PO

## 2018-01-02 NOTE — PROGRESS NOTES
"[facility] -  Case Progress Note    Date of Service: 1/02/18  Client Name: William Paige  YOB: 1950  MRN:  [id number]  Treating Provider: Wellington Monge, PhD LP  Type of Session: Individual  Present in Session: client only  Number of Minutes:  55    Current Symptoms/Status:  Client has been experiencing notable anxiety and depression in response to recent stressors, which has interfered with personal well-being and interpersonal functioning.      Progress Toward Treatment Goals:   He has been actively processing his emotions. He has not contacted Dr. Kirk for referrals.    Intervention: Modality and Description:  CBT and psychodynamic techniques were used to help explore the client's emotions and sexuality. Sal tore a rotator cuff in a fall. He talked about feeling helpless in his job because of clients who have mental health problems and seem unmotivated. He thought he would do more substance abuse problems than he has been doing. This was processed. Sal stated that what bothered him most was his feeling that he is ill-equipped to help the clients. He gave an example of a client who is consistently angry. Another example involved a transgender client who was difficult for several staff members. Therapist (this writer) noted that he may be blaming himself too much in the latter case. He realized he used a good skill with the client from his first example. Sal admitted that he is too hard on himself. Time was spent processing this. Therapist normalized his experiences, provided support, and noted that he should not have unrealistic expectations. Sal was encouraged to avoid having unrealistic expectations--and focus on emotional processing. He realized he was having counter-transference due to his past partner Fletcher. This led Sal to talk about his desire for a romantic relationship. His second \"\" Glen sent him a holiday card and apparently has an 18-year-old, which " made Sal depressed because he wants a relationship. Sal is on mate1, match.com, and POF for 2 weeks. After listening, therapist wondered if he is being unrealistic and impatient about the dating process, noting that he has had job fluctuations that have distracted him from dating and has not been dating long. He agreed. He feels touch-deprived. Therapist expressed concern that Sal might be too desperate in his dating process unless he learns to be more comfortable with being single. He understood the point. Professional massage and pets were discussed as ways of getting touch needs met.      Response to Intervention:  Open. Verbal. Took feedback. Tangential.    Interactive Complexity    There are four specific communication difficulties that complicate the work of the primary psychiatric procedure.  Interactive complexity (+46178) may be reported when at least one of these difficulties is present.    Communication difficulties present during current the psychiatric procedure include:  1. The need to manage maladaptive communication among participants that complicates delivery of care. Sal was tangential today, making it difficult to follow him and requiring redirection.    Assignment:  Maintain self-care. Contact Dr. Kirk for referrals. Past tasks: Prepare to talk about his mother and trauma. Write memories of his mother's homonegativity. Start keeping a dream log.      Diagnosis:  309.28 - Adjustment Disorder with Mixed Anxiety and Depressed Mood      Plan / Need for Future Services:  Return for therapy in 2 weeks.      Wellington Monge, PhD LP

## 2018-01-30 ENCOUNTER — OFFICE VISIT (OUTPATIENT)
Dept: OTHER | Facility: OUTPATIENT CENTER | Age: 68
End: 2018-01-30
Payer: COMMERCIAL

## 2018-01-30 DIAGNOSIS — F43.23 ADJUSTMENT DISORDER WITH MIXED ANXIETY AND DEPRESSED MOOD: Primary | ICD-10-CM

## 2018-01-30 NOTE — MR AVS SNAPSHOT
After Visit Summary   2018    William Paige    MRN: [id number]           Patient Information     Date Of Birth          1950        Visit Information        Provider Department      2018 5:00 PM Wellington Monge, PhD  [facility]        Today's Diagnoses     Adjustment disorder with mixed anxiety and depressed mood    -  1       Follow-ups after your visit        Who to contact     Please call your clinic at [phone] to:    Ask questions about your health    Make or cancel appointments    Discuss your medicines    Learn about your test results    Speak to your doctor   If you have compliments or concerns about an experience at your clinic, or if you wish to file a complaint, please contact [facility] Patient Relations at [phone] or email us at [email or web address]         Additional Information About Your Visit        MyChart Information     Loop is an electronic gateway that provides easy, online access to your medical records. With Loop, you can request a clinic appointment, read your test results, renew a prescription or communicate with your care team.     To sign up for PublikDemandt visit the website at [email or web address]   You will be asked to enter the access code listed below, as well as some personal information. Please follow the directions to create your username and password.     Your access code is: [email or web address]  Expires: 2018  4:19 PM     Your access code will  in 90 days. If you need help or a new code, please contact your [facility] or call [phone] for assistance.        Care EveryWhere ID     This is your Care EveryWhere ID. This could be used by other organizations to access your [facility] medical records  [id number]         Blood Pressure from Last 3 Encounters:   17 142/83    Weight from Last 3 Encounters:   17 90.3 kg (199 lb)               We Performed the Following     Individual Psychotherapy (38-52 min) [06513]     Psychotherapy Interactive Complexity [42274]        Primary Care Provider Office Phone # Fax #    Anthony Varma [phone]       [facility] [address] [place] [address]        Equal Access to Services     [facility] HospitalBETTINA : Hadii aad ku hadasho Soomaali, waaxda luqadaha, qaybta kaalmada adeegyada, waxay idiin hayaan adeeg kharash la'aan . So [facility] [phone].    ATENCIÓN: Si habla español, tiene a douglass disposición servicios gratuitos de asistencia lingüística. [facility] [phone].    We comply with applicable federal civil rights laws and [place] laws. We do not discriminate on the basis of race, color, national origin, age, disability, sex, sexual orientation, or gender identity.            Thank you!     Thank you for choosing [facility]  for your care. Our goal is always to provide you with excellent care. Hearing back from our patients is one way we can continue to improve our services. Please take a few minutes to complete the written survey that you may receive in the mail after your visit with us. Thank you!             Your Updated Medication List - Protect others around you: Learn how to safely use, store and throw away your medicines at www.disposemymeds.org.          This list is accurate as of 1/30/18  5:49 PM.  Always use your most recent med list.                   Brand Name Dispense Instructions for use Diagnosis    albuterol 108 (90 BASE) MCG/ACT Inhaler    PROAIR HFA    1 Inhaler    Inhale 2 puffs into the lungs every 4 hours as needed for shortness of breath / dyspnea        azithromycin 250 MG tablet    ZITHROMAX    4 tablet    one tablet daily for four more days.        dextromethorphan 30 MG/5ML liquid    DELSYM    148 mL    Take 10 mLs (60 mg) by mouth 2 times daily        LISINOPRIL PO           VENLAFAXINE HCL PO
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
## 2018-03-13 ENCOUNTER — OFFICE VISIT (OUTPATIENT)
Dept: OTHER | Facility: OUTPATIENT CENTER | Age: 68
End: 2018-03-13
Payer: COMMERCIAL

## 2018-03-13 DIAGNOSIS — F43.23 ADJUSTMENT DISORDER WITH MIXED ANXIETY AND DEPRESSED MOOD: Primary | ICD-10-CM
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
## 2018-03-13 NOTE — MR AVS SNAPSHOT
After Visit Summary   3/13/2018    William Paige    MRN: [id number]           Patient Information     Date Of Birth          1950        Visit Information        Provider Department      3/13/2018 5:00 PM Wellington Monge, PhD LP [facility]        Today's Diagnoses     Adjustment disorder with mixed anxiety and depressed mood    -  1       Follow-ups after your visit        Your next 10 appointments already scheduled     Mar 27, 2018  5:00 PM CDT   Individual Therapy 53+ minutes with Wellington Monge, PhD DEV   [facility] ([facility])    [address]  Mail [address] 7521  [facility] [address]   [phone]              Who to contact     Please call your clinic at [phone] to:    Ask questions about your health    Make or cancel appointments    Discuss your medicines    Learn about your test results    Speak to your doctor            Additional Information About Your Visit        MyChart Information     The New Craftsment is an electronic gateway that provides easy, online access to your medical records. With RichRelevance, you can request a clinic appointment, read your test results, renew a prescription or communicate with your care team.     To sign up for The New Craftsment visit the website at [email or web address]   You will be asked to enter the access code listed below, as well as some personal information. Please follow the directions to create your username and password.     Your access code is: [email or web address]  Expires: 2018  5:55 PM     Your access code will  in 90 days. If you need help or a new code, please contact your [facility] or call [phone] for assistance.        Care EveryWhere ID     This is your Care EveryWhere ID. This could be used by other organizations to access your [facility] medical records  [id number]         Blood Pressure from Last 3 Encounters:   17 142/83    Weight from Last 3 Encounters:    08/16/17 90.3 kg (199 lb)              We Performed the Following     Individual Psychotherapy (53+ min) [98249]     Psychotherapy Interactive Complexity [97179]        Primary Care Provider Office Phone # Fax #    Anthony Varma [phone]       [facility] [address] [place] [address]        Equal Access to Services     LURDES PETERSON : Hadii aad ku hadasho Soomaali, waaxda luqadaha, qaybta kaalmada adeegyada, waxay idiin hayaan adeeg kharash la'aan ah. So [facility] [phone].    ATENCIÓN: Si stacy cevallos, tiene a douglass disposición servicios gratuitos de asistencia lingüística. Llame al [phone].    We comply with applicable federal civil rights laws and [place] laws. We do not discriminate on the basis of race, color, national origin, age, disability, sex, sexual orientation, or gender identity.            Thank you!     Thank you for choosing [facility]  for your care. Our goal is always to provide you with excellent care. Hearing back from our patients is one way we can continue to improve our services. Please take a few minutes to complete the written survey that you may receive in the mail after your visit with us. Thank you!             Your Updated Medication List - Protect others around you: Learn how to safely use, store and throw away your medicines at www.disposemymeds.org.          This list is accurate as of 3/13/18  5:55 PM.  Always use your most recent med list.                   Brand Name Dispense Instructions for use Diagnosis    albuterol 108 (90 BASE) MCG/ACT Inhaler    PROAIR HFA    1 Inhaler    Inhale 2 puffs into the lungs every 4 hours as needed for shortness of breath / dyspnea        azithromycin 250 MG tablet    ZITHROMAX    4 tablet    one tablet daily for four more days.        dextromethorphan 30 MG/5ML liquid    DELSYM    148 mL    Take 10 mLs (60 mg) by mouth 2 times daily        LISINOPRIL PO           VENLAFAXINE  HCL PO

## 2018-03-17 ENCOUNTER — TEAM CONFERENCE (OUTPATIENT)
Dept: OTHER | Facility: OUTPATIENT CENTER | Age: 68
End: 2018-03-17

## 2018-03-27 ENCOUNTER — OFFICE VISIT (OUTPATIENT)
Dept: OTHER | Facility: OUTPATIENT CENTER | Age: 68
End: 2018-03-27
Payer: COMMERCIAL

## 2018-03-27 DIAGNOSIS — F43.23 ADJUSTMENT DISORDER WITH MIXED ANXIETY AND DEPRESSED MOOD: Primary | ICD-10-CM

## 2018-03-27 NOTE — PROGRESS NOTES
[facility] -  Case Progress Note    Date of Service: 3/27/18  Client Name: William Paige  YOB: 1950  MRN:  [id number]  Treating Provider: Wellington Monge, PhD LP  Type of Session: Individual  Present in Session: client only  Number of Minutes:  53    Current Symptoms/Status:  Client has been experiencing notable anxiety and depression in response to recent stressors, which has interfered with personal well-being and interpersonal functioning.    Progress Toward Treatment Goals:   He has been actively processing his emotions.    Intervention: Modality and Description:  CBT and psychodynamic techniques were used to help explore the client's emotions and sexuality. Sal thinks he missed his appointment with Maurice Maharaj because he did not have the appointment in his phone. Work continues to be frantic. His boss wants to talk to him, but they have not met yet. He wonders what she wants to discuss. Sal wonders if it is about recent anger issues. He thinks he is grieving Fletcher's death. He wonders if he should be working just because he wants to travel. Sal talked about frustrations with his job. He wishes he had less paperwork to do. He wants to do more therapy. He is realizing that mental health is more involved in substance abuse recovery. Sal jumped around on topics. Therapist suggested he make an appointment with his boss to ascertain what her concerns are. After talking, he admitted that he needs to budget more. Sal gained his MA from [employer] in order to give back to others. Sal thinks his anger may be related to Fletcher's death. Therapist did not understand the possible connection. Sal is mad at a Fletcher for not getting sober. He admitted that he did not try to get Fletcher to be sober. Therapist suggested his feelings about Fletcher are not related to his anger at work. Time was spent talking about how clients may not be acting the way he expected. Sal was advised that he may  need to adjust his expectations of clients as well as his role and degree to which he can see change. He was advised to not expect to get positive feedback nor assume he is not making a difference. Sal was encouraged to be clear about his life needs or group home interests, create and follow a budget, and consult a . He understood. Sal has been enjoying his relationship with Matheus.      Response to Intervention:  Open. Verbal. Took feedback. Tangential. Modest memory.    Interactive Complexity    There are four specific communication difficulties that complicate the work of the primary psychiatric procedure.  Interactive complexity (+48350) may be reported when at least one of these difficulties is present.    Communication difficulties present during current the psychiatric procedure include:  1. The need to manage maladaptive communication among participants that complicates delivery of care. Sal was tangential today, making it difficult to follow him and requiring redirection. His memory and ability to track well were not strong.    Assignment:  Maintain self-care. Talk to his supervisor. Talk to Matheus about sex. Find letter he wrote to Flaca and write one if needed. Past tasks: Contact Dr. Kirk for referrals.       Diagnosis:  309.28 - Adjustment Disorder with Mixed Anxiety and Depressed Mood      Plan / Need for Future Services:  Return for therapy in 2 weeks.      Wellington Monge, PhD LP

## 2018-03-27 NOTE — MR AVS SNAPSHOT
After Visit Summary   3/27/2018    William Paige    MRN: [id number]           Patient Information     Date Of Birth          1950        Visit Information        Provider Department      3/27/2018 5:00 PM Wellington Monge, PhD  [facility]        Today's Diagnoses     Adjustment disorder with mixed anxiety and depressed mood    -  1       Follow-ups after your visit        Your next 10 appointments already scheduled     2018  6:00 PM CDT   PSYCH EVALUATION with Maurice Barahona PA-C   [facility] ([facility])    [address]  Mail [address] 75[address]  [facility] [address]   [phone]            Apr 10, 2018  4:00 PM CDT   Individual Therapy 53+ minutes with Wellington Monge, PhD DEV   [facility] ([facility])    [address]  [address]  [facility] [address]   [phone]              Who to contact     Please call your clinic at [phone] to:    Ask questions about your health    Make or cancel appointments    Discuss your medicines    Learn about your test results    Speak to your doctor            Additional Information About Your Visit        VoxwareharTouristR Information     Quantec Geoscience is an electronic gateway that provides easy, online access to your medical records. With Quantec Geoscience, you can request a clinic appointment, read your test results, renew a prescription or communicate with your care team.     To sign up for Quantec Geoscience visit the website at [email or web address]   You will be asked to enter the access code listed below, as well as some personal information. Please follow the directions to create your username and password.     Your access code is: [email or web address]  Expires: 2018  5:55 PM     Your access code will  in 90 days. If you need help or a new code, please contact your [facility] or call [phone] for assistance.        Care EveryWhere ID     This is your Care  EveryWhere ID. This could be used by other organizations to access your [facility] medical records  [id number]         Blood Pressure from Last 3 Encounters:   08/17/17 142/83    Weight from Last 3 Encounters:   08/16/17 90.3 kg (199 lb)              We Performed the Following     Individual Psychotherapy (53+ min) [83781]     Psychotherapy Interactive Complexity [22207]        Primary Care Provider Office Phone # Fax #    Anthony Anupam Varma [phone]       [facility] [address] [place] [address]        Equal Access to Services     [facility] CenterBETTINA : Hadii aad ku hadasho Soomaali, waaxda luqadaha, qaybta kaalmada adeegyada, ina goddard hayhieu chang . So [facility] [phone].    ATENCIÓN: Si habla español, tiene a douglass disposición servicios gratuitos de asistencia lingüística. [facility] [phone].    We comply with applicable federal civil rights laws and [place] laws. We do not discriminate on the basis of race, color, national origin, age, disability, sex, sexual orientation, or gender identity.            Thank you!     Thank you for choosing [facility]  for your care. Our goal is always to provide you with excellent care. Hearing back from our patients is one way we can continue to improve our services. Please take a few minutes to complete the written survey that you may receive in the mail after your visit with us. Thank you!             Your Updated Medication List - Protect others around you: Learn how to safely use, store and throw away your medicines at www.disposemymeds.org.          This list is accurate as of 3/27/18  5:55 PM.  Always use your most recent med list.                   Brand Name Dispense Instructions for use Diagnosis    albuterol 108 (90 BASE) MCG/ACT Inhaler    PROAIR HFA    1 Inhaler    Inhale 2 puffs into the lungs every 4 hours as needed for shortness of breath / dyspnea        azithromycin 250 MG tablet     ZITHROMAX    4 tablet    one tablet daily for four more days.        dextromethorphan 30 MG/5ML liquid    DELSYM    148 mL    Take 10 mLs (60 mg) by mouth 2 times daily        LISINOPRIL PO           VENLAFAXINE HCL PO

## 2018-04-04 ENCOUNTER — OFFICE VISIT (OUTPATIENT)
Dept: OTHER | Facility: OUTPATIENT CENTER | Age: 68
End: 2018-04-04
Payer: COMMERCIAL

## 2018-04-04 DIAGNOSIS — F90.0 ATTENTION DEFICIT HYPERACTIVITY DISORDER (ADHD), PREDOMINANTLY INATTENTIVE TYPE: Primary | ICD-10-CM

## 2018-04-04 RX ORDER — LISDEXAMFETAMINE DIMESYLATE 20 MG/1
20 CAPSULE ORAL DAILY
Qty: 30 CAPSULE | Refills: 0 | Status: SHIPPED | OUTPATIENT
Start: 2018-04-04 | End: 2018-05-04

## 2018-04-04 NOTE — MR AVS SNAPSHOT
After Visit Summary   2018    Willaim Paige    MRN: [id number]           Patient Information     Date Of Birth          1950        Visit Information        Provider Department      2018 6:00 PM Maurice Barahona PA-C [facility]        Today's Diagnoses     Attention deficit hyperactivity disorder (ADHD), predominantly inattentive type    -  1      Care Instructions    1)Vyvanse 20 mg: Take 1 capsule daily in the morning.     2)See me in 1 month          Follow-ups after your visit        Your next 10 appointments already scheduled     Apr 10, 2018  4:00 PM CDT   Individual Therapy 53+ minutes with Wellington Monge, PhD    [facility] ([facility]    [address]  Mail Code 7521  [facility] [address]   [phone]            May 02, 2018  6:00 PM CDT   MED MANAGEMENT with Maurice Barahona PA-C   [facility] ([facility])    [address]  Mail [address] 7521  [facility] [address]   [phone]              Who to contact     Please call your clinic at [phone] to:    Ask questions about your health    Make or cancel appointments    Discuss your medicines    Learn about your test results    Speak to your doctor            Additional Information About Your Visit        MyChart Information     [facility] is an electronic gateway that provides easy, online access to your medical records. With [facility], you can request a clinic appointment, read your test results, renew a prescription or communicate with your care team.     To sign up for [facility] visit the website at [email or web address]   You will be asked to enter the access code listed below, as well as some personal information. Please follow the directions to create your username and password.     Your access code is: [email or web address]  Expires: 2018  5:55 PM     Your access code will  in 90 days. If you need help or a new code, please contact your  [facility] or call [phone] for assistance.        Care EveryWhere ID     This is your Care EveryWhere ID. This could be used by other organizations to access your [facility] medical records  [id number]         Blood Pressure from Last 3 Encounters:   08/17/17 142/83    Weight from Last 3 Encounters:   08/16/17 90.3 kg (199 lb)              Today, you had the following     No orders found for display         Today's Medication Changes          These changes are accurate as of 4/4/18 11:59 PM.  If you have any questions, ask your nurse or doctor.               Start taking these medicines.        Dose/Directions    lisdexamfetamine 20 MG capsule   Commonly known as:  VYVANSE   Used for:  Attention deficit hyperactivity disorder (ADHD), predominantly inattentive type        Dose:  20 mg   Take 1 capsule (20 mg) by mouth daily   Quantity:  30 capsule   Refills:  0            Where to get your medicines      Some of these will need a paper prescription and others can be bought over the counter.  Ask your nurse if you have questions.     Bring a paper prescription for each of these medications     lisdexamfetamine 20 MG capsule                Primary Care Provider Office Phone # Fax #    Anthony Anupam Varma [phone]       [facility] [address] [place] [address]        Equal Access to Services     LURDES PETERSON AH: Kiran robisono Soomaali, waaxda luqadaha, qaybta kaalmada adeegyada, ina arrington. So [facility] [phone].    ATENCIÓN: Si habla español, tiene a douglass disposición servicios gratuitos de asistencia lingüística. Llame al [phone].    We comply with applicable federal civil rights laws and [place] laws. We do not discriminate on the basis of race, color, national origin, age, disability, sex, sexual orientation, or gender identity.            Thank you!     Thank you for choosing [facility]  for your care. Our goal is always to provide you with excellent care. Hearing back from our patients is one way we can continue to improve our services. Please take a few minutes to complete the written survey that you may receive in the mail after your visit with us. Thank you!             Your Updated Medication List - Protect others around you: Learn how to safely use, store and throw away your medicines at www.disposemymeds.org.          This list is accurate as of 4/4/18 11:59 PM.  Always use your most recent med list.                   Brand Name Dispense Instructions for use Diagnosis    albuterol 108 (90 BASE) MCG/ACT Inhaler    PROAIR HFA    1 Inhaler    Inhale 2 puffs into the lungs every 4 hours as needed for shortness of breath / dyspnea        azithromycin 250 MG tablet    ZITHROMAX    4 tablet    one tablet daily for four more days.        dextromethorphan 30 MG/5ML liquid    DELSYM    148 mL    Take 10 mLs (60 mg) by mouth 2 times daily        lisdexamfetamine 20 MG capsule    VYVANSE    30 capsule    Take 1 capsule (20 mg) by mouth daily    Attention deficit hyperactivity disorder (ADHD), predominantly inattentive type       LISINOPRIL PO           VENLAFAXINE HCL PO

## 2018-04-04 NOTE — PROGRESS NOTES
[facility] - Med Management    Name:  William Paige   Aliases:    :  1950   MRN:  [id number]  Treating Provider: Maurice Barahona PA-C EdD     Medications at start of visit:  Outpatient Medications Prior to Visit   Medication Sig Dispense Refill     albuterol (ALBUTEROL) 108 (90 BASE) MCG/ACT Inhaler Inhale 2 puffs into the lungs every 4 hours as needed for shortness of breath / dyspnea 1 Inhaler 0     dextromethorphan (DELSYM) 30 MG/5ML liquid Take 10 mLs (60 mg) by mouth 2 times daily 148 mL 0     azithromycin (ZITHROMAX) 250 MG tablet one tablet daily for four more days. 4 tablet 0     LISINOPRIL PO        VENLAFAXINE HCL PO        No facility-administered medications prior to visit.        Allergies:  No Known Allergies       Today's Visit    Current Complaint: William presents for psychiatric medication management, referred by Dr. Monge.  Today the patient states that his main concern is the possibility of ADHD.  He states that in the past he was diagnosed with ADHD and was treated medically without significant improvement (Strattera, Ritalin).  He is currently taking venlafaxine and trazodone, which he reports have been beneficial for depressive sx and sleep.      Review of Systems: A review of the following systems was negative: Opthalmic, ENT, Cardiovascular, Gastrointestinal, Genitourinary, Musculoskeletal, Integumentary, Neurological, Endocrine, Respiratory, Hematologic, Immunologic      Chemical History: Denies usage of and cravings for alcohol, cannabis, heroin, methamphetamine, cocaine, prescription opioids/benzodiazepines.  Willaim has 7 yrs of sobriety.     Social History: Per Dr. Monge    Mental Status Exam: The patient's orientation, memory,  Attention, language and fund of knowledge are at their usual best baseline.  Grooming/Hygiene: adequate  Eye Contact: normal  Psychomotor: normal  Observed mood and affect: appropriate  Judgment: intact, with thoughtful decision making and insight  Speech:  normal rate, rhythm, tone and volume  Thought processes: normal, with normal rate of thought  Associations:  No deficiency  Abnormal Thoughts: none        Assessment: This is a 66 yo male presenting with a constellation of s/s suggesting a degree of impairment with executive function.  He has previously carried the dx of ADHD and based on clinical interview today, he would appear to meet criteria for ADHD inattentive.  The patient's questions were answered to their satisfaction regarding the plan as outlined below. Side effects, risks/benefits/alternatives regarding all psychiatric medications were discussed with the patient in detail.      Specifically we discussed the nature of his dx and the s/e of stimulant medications, particularly Vyvanse.         Plan:  1)Vyvanse 20 mg: Take 1 capsule daily in the morning.     2)See me in 1 month      Total face-to-face time: 30 min    Counseling/Coordination of care greater than 50%.    Counseling/Coordination of care included: Educational counseling regarding psychiatric medications, side effects, interactions.

## 2018-08-13 ENCOUNTER — DOCUMENTATION ONLY (OUTPATIENT)
Dept: OTHER | Facility: OUTPATIENT CENTER | Age: 68
End: 2018-08-13

## 2018-08-13 DIAGNOSIS — F43.23 ADJUSTMENT DISORDER WITH MIXED ANXIETY AND DEPRESSED MOOD: Primary | ICD-10-CM
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
## 2023-03-30 NOTE — TELEPHONE ENCOUNTER
No auth required for Psych testing, Okay to proceed.  
Order the following Psych Test(s) or Psych Testing Packet(s).      When this text is complete, please do NOT close the encounter and Route it to the [facility] Prior Auth pool.    Patient Name:  William Trujillo:     MRN:  [id number]  Diagnosis:  309.28 - Adjustment Disorder with Mixed Anxiety and Depressed Mood    Reason for Testing:  Clarify symptoms and personality features  Treating Provider: Wellington Monge, PhD LP    Estimated Hours of Test Interpretation:  3    Please get Prior Auth for Psychological testing of the following tests:    [facility] Psych Testing Packet :     :  -  Fragoso Anxiety Inventory (CHRISTOPHER)  -  Fragoso Depression Inventory (BDI)  -  MMPI  -  [place] Clinical Multiaxial Inventory II (MCMI)        Once Prior Auth is obtained please forward to :  [facility]  :  Schedule client to come in and complete the tests listed above.  
yes